# Patient Record
Sex: FEMALE | Race: WHITE | Employment: OTHER | URBAN - METROPOLITAN AREA
[De-identification: names, ages, dates, MRNs, and addresses within clinical notes are randomized per-mention and may not be internally consistent; named-entity substitution may affect disease eponyms.]

---

## 2024-01-04 ENCOUNTER — LAB ENCOUNTER (OUTPATIENT)
Dept: LAB | Age: 79
End: 2024-01-04
Attending: Other
Payer: MEDICARE

## 2024-01-04 ENCOUNTER — HOSPITAL ENCOUNTER (OUTPATIENT)
Dept: CT IMAGING | Facility: HOSPITAL | Age: 79
Discharge: HOME | End: 2024-01-04
Attending: Other
Payer: MEDICARE

## 2024-01-04 ENCOUNTER — OFFICE VISIT (OUTPATIENT)
Dept: NEUROLOGY | Facility: CLINIC | Age: 79
End: 2024-01-04
Payer: MEDICARE

## 2024-01-04 VITALS
SYSTOLIC BLOOD PRESSURE: 131 MMHG | WEIGHT: 127.88 LBS | HEART RATE: 68 BPM | DIASTOLIC BLOOD PRESSURE: 68 MMHG | RESPIRATION RATE: 16 BRPM

## 2024-01-04 DIAGNOSIS — R55 SYNCOPE, UNSPECIFIED SYNCOPE TYPE: ICD-10-CM

## 2024-01-04 DIAGNOSIS — Z86.73 HISTORY OF STROKE: Primary | ICD-10-CM

## 2024-01-04 DIAGNOSIS — Z86.73 HISTORY OF STROKE: ICD-10-CM

## 2024-01-04 DIAGNOSIS — S09.90XA TRAUMATIC INJURY OF HEAD, INITIAL ENCOUNTER: ICD-10-CM

## 2024-01-04 LAB
ALBUMIN SERPL-MCNC: 4.3 G/DL (ref 3.4–5)
ALBUMIN/GLOB SERPL: 1.4 {RATIO} (ref 1–2)
ALP LIVER SERPL-CCNC: 60 U/L
ALT SERPL-CCNC: 18 U/L
ANION GAP SERPL CALC-SCNC: 4 MMOL/L (ref 0–18)
AST SERPL-CCNC: 16 U/L (ref 15–37)
BILIRUB SERPL-MCNC: 0.3 MG/DL (ref 0.1–2)
BUN BLD-MCNC: 31 MG/DL (ref 9–23)
CALCIUM BLD-MCNC: 9.1 MG/DL (ref 8.5–10.1)
CHLORIDE SERPL-SCNC: 105 MMOL/L (ref 98–112)
CO2 SERPL-SCNC: 28 MMOL/L (ref 21–32)
CREAT BLD-MCNC: 1.34 MG/DL
EGFRCR SERPLBLD CKD-EPI 2021: 41 ML/MIN/1.73M2 (ref 60–?)
ERYTHROCYTE [DISTWIDTH] IN BLOOD BY AUTOMATED COUNT: 13.3 %
FASTING STATUS PATIENT QL REPORTED: NO
GLOBULIN PLAS-MCNC: 3 G/DL (ref 2.8–4.4)
GLUCOSE BLD-MCNC: 138 MG/DL (ref 70–99)
HCT VFR BLD AUTO: 34.1 %
HGB BLD-MCNC: 11.1 G/DL
MCH RBC QN AUTO: 30.5 PG (ref 26–34)
MCHC RBC AUTO-ENTMCNC: 32.6 G/DL (ref 31–37)
MCV RBC AUTO: 93.7 FL
OSMOLALITY SERPL CALC.SUM OF ELEC: 293 MOSM/KG (ref 275–295)
PLATELET # BLD AUTO: 196 10(3)UL (ref 150–450)
POTASSIUM SERPL-SCNC: 4.1 MMOL/L (ref 3.5–5.1)
PROT SERPL-MCNC: 7.3 G/DL (ref 6.4–8.2)
RBC # BLD AUTO: 3.64 X10(6)UL
SODIUM SERPL-SCNC: 137 MMOL/L (ref 136–145)
WBC # BLD AUTO: 9.5 X10(3) UL (ref 4–11)

## 2024-01-04 PROCEDURE — 85027 COMPLETE CBC AUTOMATED: CPT

## 2024-01-04 PROCEDURE — 80053 COMPREHEN METABOLIC PANEL: CPT

## 2024-01-04 PROCEDURE — 70450 CT HEAD/BRAIN W/O DYE: CPT | Performed by: OTHER

## 2024-01-04 PROCEDURE — 99205 OFFICE O/P NEW HI 60 MIN: CPT | Performed by: OTHER

## 2024-01-04 RX ORDER — METOPROLOL SUCCINATE 100 MG/1
TABLET, EXTENDED RELEASE ORAL
COMMUNITY
Start: 2023-11-16

## 2024-01-04 RX ORDER — ASPIRIN 325 MG
325 TABLET ORAL DAILY
COMMUNITY

## 2024-01-04 RX ORDER — AMLODIPINE BESYLATE 5 MG/1
5 TABLET ORAL DAILY
COMMUNITY
Start: 2023-12-21

## 2024-01-04 RX ORDER — TRIAMTERENE AND HYDROCHLOROTHIAZIDE 37.5; 25 MG/1; MG/1
1 TABLET ORAL DAILY
COMMUNITY

## 2024-01-04 RX ORDER — ATORVASTATIN CALCIUM 40 MG/1
40 TABLET, FILM COATED ORAL NIGHTLY
Qty: 30 TABLET | Refills: 3 | Status: SHIPPED | OUTPATIENT
Start: 2024-01-04

## 2024-01-04 RX ORDER — CLONIDINE HYDROCHLORIDE 0.1 MG/1
0.1 TABLET ORAL 2 TIMES DAILY
COMMUNITY
Start: 2023-09-11

## 2024-01-04 RX ORDER — ANTIOX #8/OM3/DHA/EPA/LUT/ZEAX 250-2.5 MG
CAPSULE ORAL
COMMUNITY

## 2024-01-04 RX ORDER — LOSARTAN POTASSIUM 100 MG/1
300 TABLET ORAL DAILY
COMMUNITY
Start: 2023-10-24

## 2024-01-04 NOTE — PATIENT INSTRUCTIONS
After your visit at the [facility] today,  please direct any follow up questions or medication needs to the staff in our [place] office so that your concerns may be promptly addressed.  We are available through [email or web address] or at the numbers below:    The phone number is:   [phone] option #1    The fax number is:  [phone]    Your pharmacy should also send any requests electronically to the [place] office.  Refill policies:    Allow 2-3 business days for refills; controlled substances may take longer.  Contact your pharmacy at least 5 days prior to running out of medication and have them send an electronic request or submit request through the “request refill” option in your [email or web address] account.  Refills are not addressed on weekends; covering physicians do not authorize routine medications on weekends.  No narcotics or controlled substances are refilled after noon on Fridays or by on call physicians.  By law, narcotics must be electronically prescribed.  A 30 day supply with no refills is the maximum allowed.  If your prescription is due for a refill, you may be due for a follow up appointment.  To best provide you care, patients receiving routine medications need to be seen at least once a year.  Patients receiving narcotic/controlled substance medications need to be seen at least once every 3 months.  In the event that your preferred pharmacy does not have the requested medication in stock (e.g. Backordered), it is your responsibility to find another pharmacy that has the requested medication available.  We will gladly send a new prescription to that pharmacy at your request.    Scheduling Tests:    If your physician has ordered radiology tests such as MRI or CT scans, please contact Central Scheduling at [phone] right away to schedule the test.  Once scheduled, the [facility] Centralized Referral Team will work with your insurance carrier to obtain pre-certification or prior authorization.   Depending on your insurance carrier, approval may take 3-10 days.  It is highly recommended patients assure they have received an authorization before having a test performed.  If test is done without insurance authorization, patient may be responsible for the entire amount billed.      Precertification and Prior Authorizations:  If your physician has recommended that you have a procedure or additional testing performed the [facility] Centralized Referral Team will contact your insurance carrier to obtain pre-certification or prior authorization.    You are strongly encouraged to contact your insurance carrier to verify that your procedure/test has been approved and is a COVERED benefit.  Although the [facility] Centralized Referral Team does its due diligence, the insurance carrier gives the disclaimer that \"Although the procedure is authorized, this does not guarantee payment.\"    Ultimately the patient is responsible for payment.   Thank you for your understanding in this matter.  Paperwork Completion:  If you require FMLA or disability paperwork for your recovery, please make sure to either drop it off or have it faxed to our office at [phone]. Be sure the form has your name and date of birth on it.  The form will be faxed to our Forms Department and they will complete it for you.  There is a 25$ fee for all forms that need to be filled out.  Please be aware there is a 10-14 day turnaround time.  You will need to sign a release of information (FATMATA) form if your paperwork does not come with one.  You may call the Forms Department with any questions at [phone].  Their fax number is [phone].

## 2024-01-04 NOTE — H&P
Neurology H&P    Elizabeth Zendejas Patient Status:  No patient class for patient encounter    1945 MRN [id number]   Location [facility], [address] [place] [address] Attending No att. providers found   Hosp Day # 0 PCP No primary care provider on file.     Subjective:  Elizabeth Zendejas is a(n) 78 year old female with a past medical history of HTN, HL, and stroke in 2023.  She comes the neurology clinic to establish care at [facility] for her recent small stroke. She comes with a friend today. She has a swollen R face and states that she was found on the floor. Her son found her on the kitchen floor. She does not remember what happened. She states that she remembers being very tired but that's it. This is not why she came to see me however. She came to see me for a stroke that occurred in 2023. She states that she was treated at [facility]. She states that she was trying to call her daughter and that she could not remember or dial the correct number. She ended up getting a hold of her sister in law (Dr. Elliott David/Sanju) who told her to go to the ED. She was taken to the ED by her family. She states that her sodium was found to be abnormal and that she also was found to have a stroke. She had some word finding difficulty and R facial weakness. Per notes she also had a UTI and hyponatremia, and BP elevated to 170's SBP    Current Medications:  Current Outpatient Medications   Medication Sig Dispense Refill    amLODIPine 5 MG Oral Tab Take 1 tablet (5 mg total) by mouth daily.      cloNIDine 0.1 MG Oral Tab Take 1 tablet (0.1 mg total) by mouth 2 (two) times daily.      losartan 100 MG Oral Tab Take 3 tablets (300 mg total) by mouth daily.      metoprolol succinate  MG Oral Tablet 24 Hr       Triamterene-HCTZ 37.5-25 MG Oral Tab Take 1 tablet by mouth daily.      Multiple Vitamins-Minerals (PRESERVISION AREDS 2) Oral Cap Take by mouth.      D-Mannose 500 MG Oral Cap Take by  mouth.      Diphenoxylate-Atropine (LOMOTIL OR) Take 2 mg by mouth as needed.      aspirin 325 MG Oral Tab Take 1 tablet (325 mg total) by mouth daily.         Problem List:  There is no problem list on file for this patient.      PMHx:  No past medical history on file.    PSHx:  No past surgical history on file.    SocHx:  Social History     Tobacco Use    Smoking status: Former     Types: Cigarettes    Smokeless tobacco: Never    Tobacco comments:     Very long long ago   Vaping Use    Vaping Use: Never used   Substance and Sexual Activity    Alcohol use: Yes     Comment: rare, special occ    Drug use: Never   Other Topics Concern    Caffeine Concern Yes     Comment: coffee daily    Exercise Yes     Comment: 3 times per week       Family History:  No family history on file.    No FH of stroke at early age    ROS:  10 point ROS completed and was negative, except for pertinent positive and negatives stated in subjective.    Objective/Physical Exam:    Vital Signs:  Resp. rate 16, weight 127 lb 13.9 oz (58 kg).    Gen: Awake and in no apparent distress  HEENT: moist mucus membranes  Neck: Supple  Cardiovascular: Regular rate and rhythm, no murmur  Pulm: CTAB  GI: non-tender, normal bowel sounds  Skin: normal, dry  Extremities: No clubbing or cyanosis      Neurologic:   MENTAL STATUS: alert, ox3, normal attention, language and fund of knowledge.      CRANIAL NERVES II to XII: PERRLA, no ptosis or diplopia, EOM intact, facial sensation intact, strong eye closure, mild R NLF flattening though difficult to tell due to facial swelling, no dysarthria, tongue midline,  no tongue fasciculations or atrophy, strong shoulder shrug.    MOTOR EXAMINATION: normal tone, no fasciculations, normal strength throughout in UEs and LEs      SENSORY EXAMINATION:  UE: intact to light touch, pinprick intact  LE: intact to light touch, pinprick intact    COORDINATION:  No dysmetria, or intention tremors     REFLEXES: 2+ at biceps, 2+  brachioradialis, 2+ at patella     GAIT: normal stance, normal  gait             Labs:       Imaging:  OSH MRI 8/2023  FINDINGS:    5 mm focus of restricted diffusion in the mid left corona radiata with a couple   punctate foci of restricted diffusion in the posterior left corona radiata.   No acute intracranial hemorrhage or extra-axial fluid collection is identified.   There are mild to moderate periventricular and deep subcortical white matter T2   FLAIR hyperintensities which are non-specific but likely represent mild to   moderate chronic microvascular ischemic changes.   No abnormal parenchymal gradient susceptibility is seen.    There is mild global parenchymal volume loss with mild associated prominence of   the ventricles and sulci. No hydrocephalus.   There is no midline shift or mass effect.  The basal cisterns are intact.    The sagittal T1 image demonstrates normal marrow signal intensity in the   calvarium, skull base, and upper cervical spine.    The expected major intracranial flow voids are present.    Stool and secretions in the left mastoid air cells. Trace secretions in the   right mastoid air cells.. Bilateral lens replacements.   IMPRESSION:    1.  Small recent infarct in the left mid corona radiata with additional couple   punctate recent infarcts in the posterior left corona radiata.     MRA brain 8/2023  IMPRESSION:   Moderate short segment luminal narrowing of the mid left M1 segment which may   relate to atherosclerotic plaque or nonocclusive thrombus.   Of note, tiny aneurysms measuring less than 3 mm in diameter and small vessel   disease involving the peripheral arterial branches cannot be excluded by this   study.     CUS     Approximately 16-49% stenosis of the proximal internal carotid arteries (closer   to 16% on gray scale images).     Assessment:  This is a 78-year-old female with a small stroke at an outside hospital and August 2023.  She came to the neurology clinic today for  follow-up and stroke management.  Her exam as noted above.  Outside hospital records were reviewed today.  Etiology of her stroke is not exactly clear, however she does have a small plaque in the left M1 segment of the MCA, and it is certainly possible that a small amount of plaque broke off causing small embolic appearing strokes.  She states that she has had a 30-day Holter monitor with no known or seen arrhythmias.  She has no history of A-fib.  Her blood pressure is under better control at this time it has been difficult to control in the past.  Her LDL was actually not so bad at 94 during hospitalizations, however for stroke prevention I will start atorvastatin 40 mg nightly.  Of other concern today is that she had a syncopal event in the kitchen recently when did not seek medical attention.  She has a fairly swollen right side of her face with bruising and edema and has no recollection of the event.  She never sought medical attention for this.  I like to the CAT scan of the head as she is on antiplatelet therapy with the amount of bruising, rule out history of intracranial bleed or subarachnoid etc.  I would also like to get an EEG to rule out or at least evaluate for any sort of seizure causing her syncopal spell.  I will also get a CMP and CBC she is establishing care in this hospital system.      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 Syncope  - EEG    3. Head trauma  - CTH  - EEG    A total of 60 minutes was spent with patient >50% of visit was spent in counseling and coordination of care, including discussion of diagnostic workup to date, discussion of medication side effects and plan for additional testing and monitoring as noted above; patient and family allowed to ask questions and all questions answered to the best of my ability.      Roland  Uriel, DO  Neurology

## 2024-01-04 NOTE — PROGRESS NOTES
Stroke at OSH on 8/23/2023.  Pt denies any stroke symptoms since August 2023.     Pt had a fall on 1/2/204.  Pt reports son found her and helped her up, but she has no memory of fall, unsure of LOC.  Pt does have facial bruising.  Pt did not seek ER care at that time.         Pt had stroke at OSH -MRI/MRAfindings;    MRI brain demonstrates small recent infarct over the left mid corona radiata with some other small infarcts over the left coronary radiata. Carotid ultrasound demonstrated no severe stenosis.MRA angio of the head demonstrates some short-segment narrowing over the mid left M1 segment likely a plaque.

## 2024-01-16 ENCOUNTER — NURSE ONLY (OUTPATIENT)
Dept: ELECTROPHYSIOLOGY | Facility: HOSPITAL | Age: 79
End: 2024-01-16
Attending: Other
Payer: MEDICARE

## 2024-01-16 DIAGNOSIS — Z86.73 HISTORY OF STROKE: ICD-10-CM

## 2024-01-16 DIAGNOSIS — R55 SYNCOPE, UNSPECIFIED SYNCOPE TYPE: ICD-10-CM

## 2024-01-16 PROCEDURE — 95816 EEG AWAKE AND DROWSY: CPT

## 2024-02-20 ENCOUNTER — TELEPHONE (OUTPATIENT)
Dept: NEUROLOGY | Facility: CLINIC | Age: 79
End: 2024-02-20

## 2024-02-20 RX ORDER — LOSARTAN POTASSIUM 100 MG/1
100 TABLET ORAL DAILY
COMMUNITY
Start: 2024-02-20

## 2024-02-20 NOTE — TELEPHONE ENCOUNTER
S: Pt reports she had episode on 2/17/2024 with LOC taken via EMS to hospital.    B: LOV      A:Pt reports she had a syncopal event on 2/17/2024 - see above. Pt informing provider per LOV request to call office with any other episodes.    Pt reports she was standing for a long time, no food or anything to drink    CT no acute process, doppler echo -\"minor issues\"    Lab WNL    PCP - reports pt believes she just passed out    PCP recommended cardiology appt, gave pt 30 event monitor, PCP decreased amlodipine to 2.5 mg     RN reviewed medications with pt and per med record it noted she was on 300 mg Losartan.  Pt reports she is taking 100 mg of Losartan, 2.5 mg of Amlodipine and 100 mg of Metoprolol ER.      R: Routed to provider.  Advised pt to seek ER care should this happen again. DIOGO.  Requested pt update CHRISTY when cardiology appt and 30 day event monitor completed. DIOGO.

## 2024-03-07 DIAGNOSIS — Z86.73 HISTORY OF STROKE: ICD-10-CM

## 2024-03-07 RX ORDER — ATORVASTATIN CALCIUM 40 MG/1
40 TABLET, FILM COATED ORAL NIGHTLY
Qty: 90 TABLET | Refills: 0 | Status: SHIPPED | OUTPATIENT
Start: 2024-03-07

## 2024-03-07 NOTE — TELEPHONE ENCOUNTER
Patient requesting 90 day supply.      Medication: ATORVASTATIN CALCIUM ORAL TABLET 40 MG      Date of last refill: 01/04/24 (#30/3)  [place]     Last office visit: 01/04/24  Due back to clinic per last office note:  3 months  Date next office visit scheduled:    Future Appointments   Date Time Provider Department Center   4/11/2024  3:00 PM Roland Trevino DO [id number]           Last OV note recommendation:    Plan:  L MCA stroke 8/2023  - LDL 92 on 8/2/23  - OSH chart reviewed  - MRI/MRA and CUS reports reviewed  - Continue aspirin 325mg PO Qday  - Start Atorvastatin 40mg nightly  - LDL goal <70     2. Syncope  - EEG     3. Head trauma  - CTH  - EEG

## 2024-04-11 ENCOUNTER — OFFICE VISIT (OUTPATIENT)
Dept: NEUROLOGY | Facility: CLINIC | Age: 79
End: 2024-04-11
Payer: MEDICARE

## 2024-04-11 VITALS
HEART RATE: 64 BPM | RESPIRATION RATE: 16 BRPM | SYSTOLIC BLOOD PRESSURE: 136 MMHG | DIASTOLIC BLOOD PRESSURE: 72 MMHG | WEIGHT: 121.94 LBS

## 2024-04-11 DIAGNOSIS — Z86.73 HISTORY OF STROKE: Primary | ICD-10-CM

## 2024-04-11 PROCEDURE — 99213 OFFICE O/P EST LOW 20 MIN: CPT | Performed by: OTHER

## 2024-04-11 RX ORDER — AMLODIPINE BESYLATE 2.5 MG/1
2.5 TABLET ORAL DAILY
COMMUNITY
Start: 2024-02-20

## 2024-04-11 NOTE — PROGRESS NOTES
Neurology H&P    Elizabeth Zendejas Patient Status:  No patient class for patient encounter    1945 MRN [id number]   Location [facility], [address] [place] [address] Attending No att. providers found   Hosp Day # 0 PCP No primary care provider on file.     Subjective:  Initial Clinic HPI 2024  Elizabeth Zendejas is a(n) 79 year old female with a past medical history of HTN, HL, and stroke in 2023.  She comes the neurology clinic to establish care at [facility] for her recent small stroke. She comes with a friend today. She has a swollen R face and states that she was found on the floor. Her son found her on the kitchen floor. She does not remember what happened. She states that she remembers being very tired but that's it. This is not why she came to see me however. She came to see me for a stroke that occurred in 2023. She states that she was treated at [facility]. She states that she was trying to call her daughter and that she could not remember or dial the correct number. She ended up getting a hold of her sister in law (Dr. Elliott David/Sanju) who told her to go to the ED. She was taken to the ED by her family. She states that her sodium was found to be abnormal and that she also was found to have a stroke. She had some word finding difficulty and R facial weakness. Per notes she also had a UTI and hyponatremia, and BP elevated to 170's SBP    Interim history:  Patient was last seen in clinic on  for her stroke.  She is currently taking atorvastatin and aspirin for secondary prevention.  Since her last clinic evaluation she had a Scan of the head which as noted below, and EEG which was normal.  CTH did not show any acute lesions or bleed.  She denies any new strokelike symptoms.  She denies any new numbness weakness tingling. She does tell me that on  she was at a party and was told that her face seemed weaker on the R. She states that she does  not think that it was actually weak however. She states that she was not feeling well and passed out. She was told to go to the ED and so did do this. She was taken to an OSH and per chart it was thought to be due to vasovagal syncope. She saw neurology and cardiology while she was in the hospital. She has a TTE. She has alsop had a holter monitor and per report no arhythmia seen on this.     Current Medications:  Current Outpatient Medications   Medication Sig Dispense Refill    amLODIPine 2.5 MG Oral Tab Take 1 tablet (2.5 mg total) by mouth daily.      atorvastatin 40 MG Oral Tab Take 1 tablet (40 mg total) by mouth nightly. 90 tablet 0    losartan 100 MG Oral Tab Take 1 tablet (100 mg total) by mouth daily.      metoprolol succinate  MG Oral Tablet 24 Hr       Triamterene-HCTZ 37.5-25 MG Oral Tab Take 1 tablet by mouth daily.      Multiple Vitamins-Minerals (PRESERVISION AREDS 2) Oral Cap Take by mouth.      D-Mannose 500 MG Oral Cap Take by mouth.      aspirin 325 MG Oral Tab Take 1 tablet (325 mg total) by mouth daily.         Problem List:  Patient Active Problem List   Diagnosis    History of stroke    Syncope    Head trauma       PMHx:  Past Medical History:    Hypertension    Stroke (cerebrum) (HCC)       PSHx:  No past surgical history on file.    SocHx:  Social History     Socioeconomic History    Marital status: Unknown   Tobacco Use    Smoking status: Former     Types: Cigarettes    Smokeless tobacco: Never    Tobacco comments:     Very long long ago   Vaping Use    Vaping status: Never Used   Substance and Sexual Activity    Alcohol use: Yes     Comment: rare, special occ    Drug use: Never   Other Topics Concern    Caffeine Concern Yes     Comment: coffee daily    Exercise Yes     Comment: 3 times per week     Social Determinants of Health     Food Insecurity: Low Risk  (2/17/2024)    [facility]    Food Insecurity     Have  there been times that your food ran out, and you didn't have money to get more?: No     Are there times that you worry that this might happen?: No   Transportation Needs: Low Risk  (2/17/2024)    [facility]    Transportation Needs     Do you have trouble getting transportation to medical appointments?: No    [facility] from [facility], [facility]    Social Connections       Family History:  Family History   Problem Relation Age of Onset    Other (Alzheimer's) Mother     Stroke Brother        No FH of stroke at early age    ROS:  10 point ROS completed and was negative, except for pertinent positive and negatives stated in subjective.    Objective/Physical Exam:    Vital Signs:  Blood pressure 136/72, pulse 64, resp. rate 16, weight 121 lb 14.6 oz (55.3 kg).    Gen: Awake and in no apparent distress  HEENT: moist mucus membranes  Neck: Supple  Cardiovascular: Regular rate and rhythm, no murmur  Pulm: CTAB  GI: non-tender, normal bowel sounds  Skin: normal, dry  Extremities: No clubbing or cyanosis      Neurologic:   MENTAL STATUS: alert, ox3, normal attention, language and fund of knowledge.      CRANIAL NERVES II to XII: PERRLA, no ptosis or diplopia, EOM intact, facial sensation intact, strong eye closure, mild R NLF flattening though difficult to tell due to facial swelling, no dysarthria, tongue midline,  no tongue fasciculations or atrophy, strong shoulder shrug.    MOTOR EXAMINATION: normal tone, no fasciculations, normal strength throughout in UEs and LEs      SENSORY EXAMINATION:  UE: intact to light touch, pinprick intact  LE: intact to light touch, pinprick intact    COORDINATION:  No dysmetria, or intention tremors     REFLEXES: 2+ at biceps, 2+ brachioradialis, 2+ at patella     GAIT: normal stance, normal  gait             Labs:       Imaging:  OSH MRI 8/2023  FINDINGS:    5 mm focus of restricted  diffusion in the mid left corona radiata with a couple   punctate foci of restricted diffusion in the posterior left corona radiata.   No acute intracranial hemorrhage or extra-axial fluid collection is identified.   There are mild to moderate periventricular and deep subcortical white matter T2   FLAIR hyperintensities which are non-specific but likely represent mild to   moderate chronic microvascular ischemic changes.   No abnormal parenchymal gradient susceptibility is seen.    There is mild global parenchymal volume loss with mild associated prominence of   the ventricles and sulci. No hydrocephalus.   There is no midline shift or mass effect.  The basal cisterns are intact.    The sagittal T1 image demonstrates normal marrow signal intensity in the   calvarium, skull base, and upper cervical spine.    The expected major intracranial flow voids are present.    Stool and secretions in the left mastoid air cells. Trace secretions in the   right mastoid air cells.. Bilateral lens replacements.   IMPRESSION:    1.  Small recent infarct in the left mid corona radiata with additional couple   punctate recent infarcts in the posterior left corona radiata.     MRA brain 8/2023  IMPRESSION:   Moderate short segment luminal narrowing of the mid left M1 segment which may   relate to atherosclerotic plaque or nonocclusive thrombus.   Of note, tiny aneurysms measuring less than 3 mm in diameter and small vessel   disease involving the peripheral arterial branches cannot be excluded by this   study.     CUS     Approximately 16-49% stenosis of the proximal internal carotid arteries (closer   to 16% on gray scale images).     EEG 1/16/24  IMPRESSION:  This EEG is a normal study recorded in the awake states. No focal, lateralizing, or epileptiform activity is seen and no seizures are recorded.      Report covers  Start 1/16/24 at 1106  End 1/16/24 at 1153    [facility] 1/4/24  CONCLUSION:  No acute bleed.  White matter signal  hyperintensities somewhat asymmetric greater to the left, which probably represent asymmetric chronic ischemic white matter changes which were present on MRI from August 2023. However if there are   clinical or symptoms suspicious for acute infarct or ischemia, then advise MRI follow-up for further evaluation of these findings.  No midline shift, mass effect herniation hydrocephalus.     Assessment:  This is a 79-year-old female with a small stroke at an outside hospital and August 2023.  She came to the neurology clinic today for follow-up and stroke management.  Her exam as noted above.  OSH records reviewed.      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 Syncope  - EEG was normal    3. Head trauma  - CTH  - EEG was normal     RTC 6 months      Roland Trevino DO  Neurology

## 2024-04-11 NOTE — PATIENT INSTRUCTIONS
After your visit at the [facility] today,  please direct any follow up questions or medication needs to the staff in our [place] office so that your concerns may be promptly addressed.  We are available through [facility] or at the numbers below:    The phone number is:   [phone] option #1    The fax number is:  [phone]    Your pharmacy should also send any requests electronically to the [place] office.  Refill policies:    Allow 2-3 business days for refills; controlled substances may take longer.  Contact your pharmacy at least 5 days prior to running out of medication and have them send an electronic request or submit request through the “request refill” option in your [facility] account.  Refills are not addressed on weekends; covering physicians do not authorize routine medications on weekends.  No narcotics or controlled substances are refilled after noon on Fridays or by on call physicians.  By law, narcotics must be electronically prescribed.  A 30 day supply with no refills is the maximum allowed.  If your prescription is due for a refill, you may be due for a follow up appointment.  To best provide you care, patients receiving routine medications need to be seen at least once a year.  Patients receiving narcotic/controlled substance medications need to be seen at least once every 3 months.  In the event that your preferred pharmacy does not have the requested medication in stock (e.g. Backordered), it is your responsibility to find another pharmacy that has the requested medication available.  We will gladly send a new prescription to that pharmacy at your request.    Scheduling Tests:    If your physician has ordered radiology tests such as MRI or CT scans, please contact Central Scheduling at [phone] right away to schedule the test.  Once scheduled, the [facility] Centralized Referral Team will work with your insurance carrier to obtain pre-certification or prior authorization.   Depending on your insurance carrier, approval may take 3-10 days.  It is highly recommended patients assure they have received an authorization before having a test performed.  If test is done without insurance authorization, patient may be responsible for the entire amount billed.      Precertification and Prior Authorizations:  If your physician has recommended that you have a procedure or additional testing performed the [facility] Centralized Referral Team will contact your insurance carrier to obtain pre-certification or prior authorization.    You are strongly encouraged to contact your insurance carrier to verify that your procedure/test has been approved and is a COVERED benefit.  Although the [facility] Centralized Referral Team does its due diligence, the insurance carrier gives the disclaimer that \"Although the procedure is authorized, this does not guarantee payment.\"    Ultimately the patient is responsible for payment.   Thank you for your understanding in this matter.  Paperwork Completion:  If you require FMLA or disability paperwork for your recovery, please make sure to either drop it off or have it faxed to our office at [phone]. Be sure the form has your name and date of birth on it.  The form will be faxed to our Forms Department and they will complete it for you.  There is a 25$ fee for all forms that need to be filled out.  Please be aware there is a 10-14 day turnaround time.  You will need to sign a release of information (FATMATA) form if your paperwork does not come with one.  You may call the Forms Department with any questions at [phone].  Their fax number is [phone].

## 2024-04-30 ENCOUNTER — APPOINTMENT (OUTPATIENT)
Dept: GENERAL RADIOLOGY | Facility: HOSPITAL | Age: 79
End: 2024-04-30
Attending: EMERGENCY MEDICINE
Payer: MEDICARE

## 2024-04-30 ENCOUNTER — APPOINTMENT (OUTPATIENT)
Dept: GENERAL RADIOLOGY | Facility: HOSPITAL | Age: 79
End: 2024-04-30
Payer: MEDICARE

## 2024-04-30 ENCOUNTER — APPOINTMENT (OUTPATIENT)
Dept: CT IMAGING | Facility: HOSPITAL | Age: 79
End: 2024-04-30
Attending: EMERGENCY MEDICINE
Payer: MEDICARE

## 2024-04-30 ENCOUNTER — HOSPITAL ENCOUNTER (OUTPATIENT)
Facility: HOSPITAL | Age: 79
Setting detail: OBSERVATION
Discharge: HOME | End: 2024-05-01
Attending: EMERGENCY MEDICINE | Admitting: HOSPITALIST
Payer: MEDICARE

## 2024-04-30 DIAGNOSIS — R55 SYNCOPE, UNSPECIFIED SYNCOPE TYPE: Primary | ICD-10-CM

## 2024-04-30 DIAGNOSIS — R19.7 DIARRHEA, UNSPECIFIED TYPE: ICD-10-CM

## 2024-04-30 LAB
ADENOVIRUS F 40/41 PCR: NEGATIVE
ALBUMIN SERPL-MCNC: 3.5 G/DL (ref 3.4–5)
ALBUMIN/GLOB SERPL: 1.1 {RATIO} (ref 1–2)
ALP LIVER SERPL-CCNC: 109 U/L
ALT SERPL-CCNC: 27 U/L
ANION GAP SERPL CALC-SCNC: 9 MMOL/L (ref 0–18)
AST SERPL-CCNC: 21 U/L (ref 15–37)
ASTROVIRUS PCR: NEGATIVE
ATRIAL RATE: 65 BPM
BASOPHILS # BLD AUTO: 0.03 X10(3) UL (ref 0–0.2)
BASOPHILS NFR BLD AUTO: 0.4 %
BILIRUB SERPL-MCNC: 0.3 MG/DL (ref 0.1–2)
BILIRUB UR QL STRIP.AUTO: NEGATIVE
BUN BLD-MCNC: 23 MG/DL (ref 9–23)
C CAYETANENSIS DNA SPEC QL NAA+PROBE: NEGATIVE
C DIFF TOX B STL QL: NEGATIVE
CALCIUM BLD-MCNC: 9.2 MG/DL (ref 8.5–10.1)
CAMPY SP DNA.DIARRHEA STL QL NAA+PROBE: NEGATIVE
CHLORIDE SERPL-SCNC: 98 MMOL/L (ref 98–112)
CLARITY UR REFRACT.AUTO: CLEAR
CO2 SERPL-SCNC: 24 MMOL/L (ref 21–32)
COLOR UR AUTO: COLORLESS
CREAT BLD-MCNC: 1.02 MG/DL
CRYPTOSP DNA SPEC QL NAA+PROBE: NEGATIVE
EAEC PAA PLAS AGGR+AATA ST NAA+NON-PRB: NEGATIVE
EC STX1+STX2 + H7 FLIC SPEC NAA+PROBE: NEGATIVE
EGFRCR SERPLBLD CKD-EPI 2021: 56 ML/MIN/1.73M2 (ref 60–?)
ENTAMOEBA HISTOLYTICA PCR: NEGATIVE
EOSINOPHIL # BLD AUTO: 0.16 X10(3) UL (ref 0–0.7)
EOSINOPHIL NFR BLD AUTO: 2.1 %
EPEC EAE GENE STL QL NAA+NON-PROBE: NEGATIVE
ERYTHROCYTE [DISTWIDTH] IN BLOOD BY AUTOMATED COUNT: 13 %
ETEC LTA+ST1A+ST1B TOX ST NAA+NON-PROBE: NEGATIVE
GIARDIA LAMBLIA PCR: NEGATIVE
GLOBULIN PLAS-MCNC: 3.3 G/DL (ref 2.8–4.4)
GLUCOSE BLD-MCNC: 172 MG/DL (ref 70–99)
GLUCOSE UR STRIP.AUTO-MCNC: NORMAL MG/DL
HCT VFR BLD AUTO: 29.5 %
HGB BLD-MCNC: 10.4 G/DL
IMM GRANULOCYTES # BLD AUTO: 0.02 X10(3) UL (ref 0–1)
IMM GRANULOCYTES NFR BLD: 0.3 %
KETONES UR STRIP.AUTO-MCNC: NEGATIVE MG/DL
LEUKOCYTE ESTERASE UR QL STRIP.AUTO: 25
LYMPHOCYTES # BLD AUTO: 2.21 X10(3) UL (ref 1–4)
LYMPHOCYTES NFR BLD AUTO: 29.5 %
MCH RBC QN AUTO: 31.7 PG (ref 26–34)
MCHC RBC AUTO-ENTMCNC: 35.3 G/DL (ref 31–37)
MCV RBC AUTO: 89.9 FL
MONOCYTES # BLD AUTO: 0.6 X10(3) UL (ref 0.1–1)
MONOCYTES NFR BLD AUTO: 8 %
NEUTROPHILS # BLD AUTO: 4.47 X10 (3) UL (ref 1.5–7.7)
NEUTROPHILS # BLD AUTO: 4.47 X10(3) UL (ref 1.5–7.7)
NEUTROPHILS NFR BLD AUTO: 59.7 %
NITRITE UR QL STRIP.AUTO: NEGATIVE
NOROVIRUS GI/GII PCR: NEGATIVE
OSMOLALITY SERPL CALC.SUM OF ELEC: 280 MOSM/KG (ref 275–295)
P AXIS: 67 DEGREES
P SHIGELLOIDES DNA STL QL NAA+PROBE: NEGATIVE
P-R INTERVAL: 178 MS
PH UR STRIP.AUTO: 6 [PH] (ref 5–8)
PLATELET # BLD AUTO: 197 10(3)UL (ref 150–450)
POTASSIUM SERPL-SCNC: 3.6 MMOL/L (ref 3.5–5.1)
PROT SERPL-MCNC: 6.8 G/DL (ref 6.4–8.2)
PROT UR STRIP.AUTO-MCNC: NEGATIVE MG/DL
Q-T INTERVAL: 404 MS
QRS DURATION: 80 MS
QTC CALCULATION (BEZET): 420 MS
R AXIS: -18 DEGREES
RBC # BLD AUTO: 3.28 X10(6)UL
RBC UR QL AUTO: NEGATIVE
ROTAVIRUS A PCR: NEGATIVE
SALMONELLA DNA SPEC QL NAA+PROBE: NEGATIVE
SAPOVIRUS PCR: NEGATIVE
SHIGELLA SP+EIEC IPAH ST NAA+NON-PROBE: NEGATIVE
SODIUM SERPL-SCNC: 131 MMOL/L (ref 136–145)
SP GR UR STRIP.AUTO: 1.01 (ref 1–1.03)
T AXIS: 33 DEGREES
TROPONIN I SERPL HS-MCNC: 5 NG/L
UROBILINOGEN UR STRIP.AUTO-MCNC: NORMAL MG/DL
V CHOLERAE DNA SPEC QL NAA+PROBE: NEGATIVE
VENTRICULAR RATE: 65 BPM
VIBRIO DNA SPEC NAA+PROBE: NEGATIVE
WBC # BLD AUTO: 7.5 X10(3) UL (ref 4–11)
YERSINIA DNA SPEC NAA+PROBE: NEGATIVE

## 2024-04-30 PROCEDURE — 70450 CT HEAD/BRAIN W/O DYE: CPT | Performed by: EMERGENCY MEDICINE

## 2024-04-30 PROCEDURE — 71045 X-RAY EXAM CHEST 1 VIEW: CPT | Performed by: EMERGENCY MEDICINE

## 2024-04-30 PROCEDURE — 99223 1ST HOSP IP/OBS HIGH 75: CPT | Performed by: HOSPITALIST

## 2024-04-30 RX ORDER — TRIAMTERENE AND HYDROCHLOROTHIAZIDE 37.5; 25 MG/1; MG/1
1 CAPSULE ORAL EVERY MORNING
COMMUNITY
End: 2024-05-01

## 2024-04-30 RX ORDER — SENNOSIDES 8.6 MG
17.2 TABLET ORAL NIGHTLY PRN
Status: DISCONTINUED | OUTPATIENT
Start: 2024-04-30 | End: 2024-05-01

## 2024-04-30 RX ORDER — POLYETHYLENE GLYCOL 3350 17 G/17G
17 POWDER, FOR SOLUTION ORAL DAILY PRN
Status: DISCONTINUED | OUTPATIENT
Start: 2024-04-30 | End: 2024-05-01

## 2024-04-30 RX ORDER — ACETAMINOPHEN 500 MG
500 TABLET ORAL EVERY 4 HOURS PRN
Status: DISCONTINUED | OUTPATIENT
Start: 2024-04-30 | End: 2024-05-01

## 2024-04-30 RX ORDER — AMLODIPINE BESYLATE 2.5 MG/1
2.5 TABLET ORAL DAILY
Status: DISCONTINUED | OUTPATIENT
Start: 2024-04-30 | End: 2024-05-01

## 2024-04-30 RX ORDER — METOCLOPRAMIDE HYDROCHLORIDE 5 MG/ML
5 INJECTION INTRAMUSCULAR; INTRAVENOUS EVERY 8 HOURS PRN
Status: DISCONTINUED | OUTPATIENT
Start: 2024-04-30 | End: 2024-05-01

## 2024-04-30 RX ORDER — BISACODYL 10 MG
10 SUPPOSITORY, RECTAL RECTAL
Status: DISCONTINUED | OUTPATIENT
Start: 2024-04-30 | End: 2024-05-01

## 2024-04-30 RX ORDER — ATORVASTATIN CALCIUM 40 MG/1
40 TABLET, FILM COATED ORAL NIGHTLY
Status: DISCONTINUED | OUTPATIENT
Start: 2024-04-30 | End: 2024-05-01

## 2024-04-30 RX ORDER — SODIUM CHLORIDE 9 MG/ML
INJECTION, SOLUTION INTRAVENOUS CONTINUOUS
Status: ACTIVE | OUTPATIENT
Start: 2024-04-30 | End: 2024-05-01

## 2024-04-30 RX ORDER — ENEMA 19; 7 G/133ML; G/133ML
1 ENEMA RECTAL ONCE AS NEEDED
Status: DISCONTINUED | OUTPATIENT
Start: 2024-04-30 | End: 2024-05-01

## 2024-04-30 RX ORDER — ONDANSETRON 2 MG/ML
4 INJECTION INTRAMUSCULAR; INTRAVENOUS EVERY 6 HOURS PRN
Status: DISCONTINUED | OUTPATIENT
Start: 2024-04-30 | End: 2024-05-01

## 2024-04-30 RX ORDER — METOPROLOL SUCCINATE 50 MG/1
50 TABLET, EXTENDED RELEASE ORAL
Status: DISCONTINUED | OUTPATIENT
Start: 2024-05-01 | End: 2024-05-01

## 2024-04-30 RX ORDER — SODIUM CHLORIDE 9 MG/ML
INJECTION, SOLUTION INTRAVENOUS CONTINUOUS
Status: DISCONTINUED | OUTPATIENT
Start: 2024-04-30 | End: 2024-05-01

## 2024-04-30 RX ORDER — LOSARTAN POTASSIUM 100 MG/1
100 TABLET ORAL DAILY
Status: DISCONTINUED | OUTPATIENT
Start: 2024-04-30 | End: 2024-05-01

## 2024-04-30 RX ORDER — ONDANSETRON 2 MG/ML
4 INJECTION INTRAMUSCULAR; INTRAVENOUS EVERY 4 HOURS PRN
Status: DISCONTINUED | OUTPATIENT
Start: 2024-04-30 | End: 2024-04-30

## 2024-04-30 RX ORDER — ASPIRIN 325 MG
325 TABLET ORAL DAILY
Status: DISCONTINUED | OUTPATIENT
Start: 2024-04-30 | End: 2024-05-01

## 2024-04-30 NOTE — ED INITIAL ASSESSMENT (HPI)
Patient to ED via EMS.  Reports she was at the at the [facility] with her friend, had walked around then was sitting and drinking coffee with her friend.  Patient reports her friend told her that she just passed out.  No injury or trauma  Patient denies any complaints. Denies chest pain today, dizziness, or SOB.  Reports with vomit x 1 after passing out.  Hx of same in December and February, wore a holter monitor after and they didn't see anything.

## 2024-04-30 NOTE — ED QUICK NOTES
Patient had 4 BM's here in the ER, stool was solid and progressively getting more loose.  Stool sample obtained, will send to lab.

## 2024-04-30 NOTE — H&P
[facility]     Elizabeth Zendejas Patient Status:  Emergency    1945 MRN [id number]   Location [facility] EMERGENCY DEPARTMENT Attending Rogerio Rodas*   Hosp Day # 0 PCP DAYO SANCHEZ     Chief Complaint: Syncope    Subjective:    History of Present Illness:     Elizabeth Zendejas is a 79 year old female with history of stroke and hypertension history of chronic anemia GERD and syncope who was brought to emergency room to be evaluated for syncopal episode.  Patient states that she was at the park with her friend and after walking and sitting down she does not remember what happened but her friend told her that she lost her consciousness.  Patient denies any chest pain shortness of breath dizziness.  She had similar episodes last year and had a Holter monitor on at that time.    History/Other:    Past Medical History:  Past Medical History:    Hypertension    Stroke (cerebrum) (HCC)     Past Surgical History:   Past Surgical History:   Procedure Laterality Date     delivery only      Eye surgery        Family History:   Family History   Problem Relation Age of Onset    Other (Alzheimer's) Mother     Stroke Brother      Social History:    reports that she has quit smoking. Her smoking use included cigarettes. She has never used smokeless tobacco. She reports current alcohol use. She reports that she does not use drugs.     Allergies:   Allergies   Allergen Reactions    Penicillins HIVES and OTHER (SEE COMMENTS)    Sulfa Antibiotics RASH       Medications:    No current facility-administered medications on file prior to encounter.     Current Outpatient Medications on File Prior to Encounter   Medication Sig Dispense Refill    amLODIPine 2.5 MG Oral Tab Take 1 tablet (2.5 mg total) by mouth daily.      atorvastatin 40 MG Oral Tab Take 1 tablet (40 mg total) by mouth nightly. 90 tablet 0    losartan 100 MG Oral Tab Take 1 tablet (100 mg total) by  mouth daily.      metoprolol succinate  MG Oral Tablet 24 Hr       Triamterene-HCTZ 37.5-25 MG Oral Tab Take 1 tablet by mouth daily.      Multiple Vitamins-Minerals (PRESERVISION AREDS 2) Oral Cap Take by mouth.      D-Mannose 500 MG Oral Cap Take by mouth.      aspirin 325 MG Oral Tab Take 1 tablet (325 mg total) by mouth daily.         Review of Systems:   A comprehensive review of systems was completed.    Pertinent positives and negatives noted in the HPI.    Objective:   Physical Exam:    /48   Pulse 62   Temp 98.6 °F (37 °C) (Temporal)   Resp 14   Ht 5' (1.524 m)   Wt 120 lb (54.4 kg)   SpO2 99%   BMI 23.44 kg/m²   General: No acute distress, Alert  Respiratory: No rhonchi, no wheezes  Cardiovascular: S1, S2. Regular rate and rhythm  Abdomen: Soft, Non-tender, non-distended, positive bowel sounds  Neuro: No new focal deficits  Extremities: No edema      Results:    Labs:      Labs Last 24 Hours:    Recent Labs   Lab 04/30/24  1358   RBC 3.28*   HGB 10.4*   HCT 29.5*   MCV 89.9   MCH 31.7   MCHC 35.3   RDW 13.0   NEPRELIM 4.47   WBC 7.5   .0       Recent Labs   Lab 04/30/24  1358   *   BUN 23   CREATSERUM 1.02   EGFRCR 56*   CA 9.2   ALB 3.5   *   K 3.6   CL 98   CO2 24.0   ALKPHO 109   AST 21   ALT 27   BILT 0.3   TP 6.8       No results found for: \"PT\", \"INR\"    Recent Labs   Lab 04/30/24  1358   TROPHS 5       No results for input(s): \"TROP\", \"PBNP\" in the last 168 hours.    No results for input(s): \"PCT\" in the last 168 hours.    Imaging: Imaging data reviewed in Epic.    Assessment & Plan:      # 79 years old female with multiple syncopal episodes in the past also recent history of stroke presents with syncopal episode again  -Patient does not remember much about it and details are taken from family  -No focal neurological deficit  -CT scan with cerebral atrophy and chronic microvascular ischemic changes    # Continue cardiac telemetry monitoring  -No evidence of  arrhythmia  -Scheduled for loop recorder placement later this week        Plan of care discussed with patient and emergency room physician    Diana Hines MD    Supplementary Documentation:     The 21st Century Cures Act makes medical notes like these available to patients in the interest of transparency. Please be advised this is a medical document. Medical documents are intended to carry relevant information, facts as evident, and the clinical opinion of the practitioner. The medical note is intended as peer to peer communication and may appear blunt or direct. It is written in medical language and may contain abbreviations or verbiage that are unfamiliar.

## 2024-04-30 NOTE — ED QUICK NOTES
Bedside report given to receiving RN  Rounding Completed    Plan of Care reviewed. Waiting for CT scan.  Elimination needs assessed.  Bed is locked and in lowest position. Call light within reach.

## 2024-04-30 NOTE — ED QUICK NOTES
Orders for admission, patient is aware of plan and ready to go upstairs. Any questions, please call ED RN Keith at extension [phone].     Patient Covid vaccination status: Fully vaccinated     COVID Test Ordered in ED: None    COVID Suspicion at Admission: N/A    Running Infusions:      Mental Status/LOC at time of transport: A&Ox4    Other pertinent information:   CIWA score: N/A   NIH score:  N/A

## 2024-04-30 NOTE — ED PROVIDER NOTES
Patient Seen in: [facility] Emergency Department      History     Chief Complaint   Patient presents with    Syncope     Stated Complaint: syncope x 2    Subjective:   HPI    79-year-old female with past medical history of hypertension and CVA presents today for evaluation of a syncopal episode.  History is supplemented by patient's friend.  In August, patient was hospitalized for a stroke.  It was concerned that she had stroke in multiple areas that she may have had a cardiac arrhythmia.  She wore a Holter monitor which did not show any cardiac arrhythmia.  She is scheduled for loop recorder in the coming week.  In February, she suffered another syncopal episode and she was seen in outside hospital ER.  Today, patient was at the [facility] with her friend.  They were walking everything was seemingly normal.  Patient has IBS and she had several loose stools earlier today which is not atypical for her.  Just prior to arrival, they were sitting on a bench.  The patient slumped over and suffered a syncopal episode.  After the syncopal episode, patient had an episode of emesis.  Since then, patient has nearly completely returned back to her baseline self.  Patient denies any headache, weakness, sensory changes, chest pain, shortness of breath or any other physical symptoms aside from loose stools.    Objective:   Past Medical History:    Hypertension    Stroke (cerebrum) (HCC)              Past Surgical History:   Procedure Laterality Date     delivery only      Eye surgery                  Social History     Socioeconomic History    Marital status: Unknown   Tobacco Use    Smoking status: Former     Types: Cigarettes    Smokeless tobacco: Never    Tobacco comments:     Very long long ago   Vaping Use    Vaping status: Never Used   Substance and Sexual Activity    Alcohol use: Yes     Comment: rare, special occ    Drug use: Never   Other Topics Concern    Caffeine Concern Yes     Comment: coffee daily     Exercise Yes     Comment: 3 times per week     Social Determinants of Health     Food Insecurity: Low Risk  (2/17/2024)    [facility]    [facility] Insecurity     Have there been times that your food ran out, and you didn't have money to get more?: No     Are there times that you worry that this might happen?: No   Transportation Needs: Low Risk  (2/17/2024)    Received from [facility]    Transportation Needs     Do you have trouble getting transportation to medical appointments?: No    [facility]    Social Connections              Review of Systems    Positive for stated complaint: syncope x 2  Other systems are as noted in HPI.  Constitutional and vital signs reviewed.      All other systems reviewed and negative except as noted above.    Physical Exam     ED Triage Vitals   BP 04/30/24 1402 122/52   Pulse 04/30/24 1359 67   Resp 04/30/24 1359 14   Temp 04/30/24 1448 98.6 °F (37 °C)   Temp src 04/30/24 1448 Temporal   SpO2 04/30/24 1402 100 %   O2 Device 04/30/24 1359 None (Room air)       Current:/54   Pulse 59   Temp 98.6 °F (37 °C) (Temporal)   Resp 15   Ht 152.4 cm (5')   Wt 54.4 kg   SpO2 100%   BMI 23.44 kg/m²         Physical Exam  Vitals and nursing note reviewed.   Constitutional:       Appearance: Normal appearance.   HENT:      Head: Normocephalic and atraumatic.      Nose: Nose normal.      Mouth/Throat:      Mouth: Mucous membranes are moist.   Eyes:      Extraocular Movements: Extraocular movements intact.      Pupils: Pupils are equal, round, and reactive to light.   Cardiovascular:      Rate and Rhythm: Normal rate and regular rhythm.   Pulmonary:      Effort: Pulmonary effort is normal.      Breath sounds: Normal breath sounds.   Abdominal:      Palpations: Abdomen is soft.      Tenderness: There is no  abdominal tenderness.   Musculoskeletal:         General: Normal range of motion.      Cervical back: Neck supple.   Skin:     General: Skin is warm.   Neurological:      General: No focal deficit present.      Mental Status: She is alert.      Cranial Nerves: No cranial nerve deficit.      Sensory: No sensory deficit.      Motor: No weakness.      Coordination: Coordination normal.   Psychiatric:         Mood and Affect: Mood normal.               ED Course     Labs Reviewed   COMP METABOLIC PANEL (14) - Abnormal; Notable for the following components:       Result Value    Glucose 172 (*)     Sodium 131 (*)     eGFR-Cr 56 (*)     All other components within normal limits   CBC W/ DIFFERENTIAL - Abnormal; Notable for the following components:    RBC 3.28 (*)     HGB 10.4 (*)     HCT 29.5 (*)     All other components within normal limits   TROPONIN I HIGH SENSITIVITY - Normal   C. DIFFICILE(TOXIGENIC)PCR - Normal   CBC WITH DIFFERENTIAL WITH PLATELET    Narrative:     The following orders were created for panel order CBC With Differential With Platelet.  Procedure                               Abnormality         Status                     ---------                               -----------         ------                     CBC W/ DIFFERENTIAL[[id number]]          Abnormal            Final result                 Please view results for these tests on the individual orders.   URINALYSIS, ROUTINE   RAINBOW DRAW LAVENDER   RAINBOW DRAW LIGHT GREEN   RAINBOW DRAW BLUE   GI STOOL PANEL BY PCR     EKG    Rate, intervals and axes as noted on EKG Report.  Rate: 65  Rhythm: Sinus Rhythm  Reading: no stemi                 XR CHEST AP PORTABLE  (CPT=71045)    Result Date: 4/30/2024  PROCEDURE:  XR CHEST AP PORTABLE  (CPT=71045)  TECHNIQUE:  AP chest radiograph was obtained.  COMPARISON:  None.  INDICATIONS:  syncope x 2  PATIENT STATED HISTORY: (As transcribed by Technologist)               CONCLUSION:  Normal heart size and  pulmonary vascularity.  No focal infiltrate, consolidation, effusion or pneumothorax.   LOCATION:  [id number]      Dictated by (CST): Ann Mejia MD on 4/30/2024 at 4:19 PM     Finalized by (CST): Ann Mejia MD on 4/30/2024 at 4:20 PM               [facility]      Differential Diagnosis  79-year-old female presents today for evaluation of a syncopal episode.  Patient states she is currently back to her baseline self.  Her only physical symptom is loose stools that she attributes to her IBS.  Her neuroexam is unremarkable.  She is currently hemodynamically stable and well-appearing.  Plan for CT of the head to assess for acute intracranial abnormality.  Will obtain labs to assess for acute kidney injury, electrolyte abnormality, myocardial ischemia or anemia.  Will observe and reassess.    5:09 pm  Patient's ED workup to this point is unremarkable.  We are still awaiting CT scan results at this time.  On reassessment, she is hemodynamically stable and in no acute distress.  She has no neurological deficits currently.  While in the ED, patient has had greater than 5 loose stools.  I suspect that may be hypovolemia from loose stools throughout the day today contributed to her syncope.  Will admit to the hospital for monitoring and fluids.  I spoke with the hospitalist in regards to admission.    External Chart Reviewed  I reviewed documentation and the MRI report from her hospitalization in August 2023    History from [facility]  Patient's friend provides history    Discussions of Management  I spoke with the hospitalist in regards to admission                                   Medical Decision Making      Disposition and Plan     Clinical Impression:  1. Syncope, unspecified syncope type    2. Diarrhea, unspecified type         Disposition:  Admit    Follow-up:  No follow-up provider specified.        Medications Prescribed:  Current Discharge Medication List

## 2024-05-01 ENCOUNTER — APPOINTMENT (OUTPATIENT)
Dept: INTERVENTIONAL RADIOLOGY/VASCULAR | Facility: HOSPITAL | Age: 79
End: 2024-05-01
Attending: NURSE PRACTITIONER
Payer: MEDICARE

## 2024-05-01 VITALS
WEIGHT: 121.81 LBS | TEMPERATURE: 98 F | SYSTOLIC BLOOD PRESSURE: 135 MMHG | RESPIRATION RATE: 23 BRPM | DIASTOLIC BLOOD PRESSURE: 44 MMHG | HEART RATE: 61 BPM | BODY MASS INDEX: 23.91 KG/M2 | OXYGEN SATURATION: 93 % | HEIGHT: 60 IN

## 2024-05-01 PROCEDURE — 99239 HOSP IP/OBS DSCHRG MGMT >30: CPT | Performed by: INTERNAL MEDICINE

## 2024-05-01 PROCEDURE — 0JH632Z INSERTION OF MONITORING DEVICE INTO CHEST SUBCUTANEOUS TISSUE AND FASCIA, PERCUTANEOUS APPROACH: ICD-10-PCS | Performed by: NURSE PRACTITIONER

## 2024-05-01 RX ORDER — LIDOCAINE HYDROCHLORIDE AND EPINEPHRINE BITARTRATE 20; .01 MG/ML; MG/ML
INJECTION, SOLUTION SUBCUTANEOUS
Status: COMPLETED
Start: 2024-05-01 | End: 2024-05-01

## 2024-05-01 RX ORDER — CHLORHEXIDINE GLUCONATE 40 MG/ML
30 SOLUTION TOPICAL
Status: DISCONTINUED | OUTPATIENT
Start: 2024-05-02 | End: 2024-05-01

## 2024-05-01 RX ORDER — LOPERAMIDE HYDROCHLORIDE 2 MG/1
2 CAPSULE ORAL 4 TIMES DAILY PRN
Status: DISCONTINUED | OUTPATIENT
Start: 2024-05-01 | End: 2024-05-01

## 2024-05-01 NOTE — ED QUICK NOTES
Rounding Completed    Plan of Care reviewed. Waiting for room assignment.  Elimination needs assessed.  Provided blanket.    Bed is locked and in lowest position. Call light within reach.

## 2024-05-01 NOTE — CONSULTS
[facility]  Cardiology Consultation    Elizabeth Zendejas Patient Status:  Observation    1945 MRN [id number]   Location [facility] 8NE-A Attending Daniel Oshea,    Hosp Day # 0 PCP DAYO SANCHEZ     Consults      Reason for Consultation     Retired nurse here with syncope. Pt saw Dr. Spencer recently        History of Present Illness     Elizabeth Zendejas is a a(n) 79 year old female here with syncope   Was evaluated as an outpt for recurrent syncope with ILR implantation planned.      Patient has had no full episodes in her lifetime.  One of the episodes has more of a vasovagal type prodrome but the others were more abrupt in onset.  1 actually resulted in facial injury.  All diagnostic testing has been negative to date.  There have been no events while she has been wearing the heart monitors in the past.    The current episode occurred while she was seated after she had been walking around the [facility].  Over 9000 steps      History:     Past Medical History:    Esophageal reflux    High blood pressure    High cholesterol    Hypertension    IBS (irritable bowel syndrome)    Stroke (cerebrum) (HCC)     Past Surgical History:   Procedure Laterality Date     delivery only      Eye surgery       Family History   Problem Relation Age of Onset    Other (Alzheimer's) Mother     Stroke Brother       reports that she has quit smoking. Her smoking use included cigarettes. She has never used smokeless tobacco. She reports current alcohol use. She reports that she does not use drugs.      Allergies:     Allergies   Allergen Reactions    Penicillins HIVES and OTHER (SEE COMMENTS)    Sulfa Antibiotics RASH         Medications       Current Facility-Administered Medications:     loperamide (Imodium) cap 2 mg, 2 mg, Oral, QID PRN    amLODIPine (Norvasc) tab 2.5 mg, 2.5 mg, Oral, Daily    aspirin tab 325 mg, 325 mg, Oral, Daily    atorvastatin (Lipitor) tab 40 mg, 40 mg, Oral, Nightly     losartan (Cozaar) tab 100 mg, 100 mg, Oral, Daily    metoprolol succinate ER (Toprol XL) 24 hr tab 50 mg, 50 mg, Oral, 2x Daily(Beta Blocker)    acetaminophen (Tylenol Extra Strength) tab 500 mg, 500 mg, Oral, Q4H PRN    polyethylene glycol (PEG 3350) (Miralax) 17 g oral packet 17 g, 17 g, Oral, Daily PRN    sennosides (Senokot) tab 17.2 mg, 17.2 mg, Oral, Nightly PRN    bisacodyl (Dulcolax) 10 MG rectal suppository 10 mg, 10 mg, Rectal, Daily PRN    fleet enema (Fleet) 7-19 GM/118ML rectal enema 133 mL, 1 enema, Rectal, Once PRN    ondansetron (Zofran) 4 MG/2ML injection 4 mg, 4 mg, Intravenous, Q6H PRN    metoclopramide (Reglan) 5 mg/mL injection 5 mg, 5 mg, Intravenous, Q8H PRN      Review of Systems     10 point ROS was negative except  NSR      Telemetry:         Telemetry: NSR      Physical Exam     Physical Exam   Blood pressure 159/58, pulse 65, temperature 97.9 °F (36.6 °C), temperature source Oral, resp. rate 19, height 5' (1.524 m), weight 121 lb 12.8 oz (55.2 kg), SpO2 98%.  Temp (24hrs), Av.3 °F (36.8 °C), Min:97.9 °F (36.6 °C), Max:98.6 °F (37 °C)    Wt Readings from Last 3 Encounters:   24 121 lb 12.8 oz (55.2 kg)   24 121 lb 14.6 oz (55.3 kg)   24 127 lb 13.9 oz (58 kg)       NAD  PERRLA/EOMI  Neck veins not elevated  Carotids- no bruits  CTA bilaterally  Cardiac- RRR  Abdomen- Soft,Nontender, normal BS  Extremities- pulses normal  Edema-   Mood /Affect Congruent  Skin- no lesions            Lab/Radiology Results     Recent Labs   Lab 24  1358   *   BUN 23   CREATSERUM 1.02   EGFRCR 56*   CA 9.2   *   K 3.6   CL 98   CO2 24.0     Recent Labs   Lab 24  1358   RBC 3.28*   HGB 10.4*   HCT 29.5*   MCV 89.9   MCH 31.7   MCHC 35.3   RDW 13.0   NEPRELIM 4.47   WBC 7.5   .0         [unfilled]  No results for input(s): \"BNP\" in the last 168 hours.  No results found for: \"PT\", \"INR\"  No results found for: \"TROP\"      CT BRAIN OR HEAD (11338)    Result Date:  2024  CONCLUSION: 1. No acute intracranial findings 2. Cerebral atrophy with chronic microvascular ischemic changes.    LOCATION:  [facility]   Dictated by (CST): Dayo Waite MD on 2024 at 5:30 PM     Finalized by (CST): Dayo Waite MD on 2024 at 5:31 PM       XR CHEST AP PORTABLE  (CPT=71045)    Result Date: 2024  CONCLUSION:  Normal heart size and pulmonary vascularity.  No focal infiltrate, consolidation, effusion or pneumothorax.   LOCATION:  [id number]      Dictated by (CST): Ann Mejia MD on 2024 at 4:19 PM     Finalized by (CST): Ann Mejia MD on 2024 at 4:20 PM       EKG 12 Lead    Result Date: 2024  Normal sinus rhythm Normal ECG No previous ECGs found in Muse Confirmed by RAMÓN CABRAL EVANS ([id number]) on 2024 11:43:35 PM       Problem List     Patient Active Problem List   Diagnosis    History of stroke    Syncope    Head trauma    Syncope, unspecified syncope type    Diarrhea, unspecified type               Diagnostic Testing Reviewed:     EK24      Event Monitor 3/2024   [facility]:      This result has an attachment that is not available.  Impression  Triggers correspond to sinus rhythm 57-72 bpm and isolated premature  ventricular contractions    Event Monitor  The monitor duration was 30 days.  Findings:  The baseline rhythm was sinus rhythm and sinus bradycardia.  Events were recorded.  15 total events were triggered.  15 were manually triggered.  0 were auto-triggered.  Symptoms reported at time of recording include chest pain.  The strips show sinus rhythm, sinus bradycardia and premature ventricular contractions.  Premature ventricular contrations were found to be isolated.  Triggers correspond to sinus rhythm 57-72 bpm and isolated premature ventricular contractions        Echo 2024 [facility]  --Study quality: good.   -Two-dimensional transthoracic echocardiography was performed using standard views & projections with M-mode and Doppler  (continuous, pulsed wave, spectral & color flow).   -The left ventricle is normal in size. There is no left ventricular hypertrophy. Left ventricular systolic function is normal. EF = 66% (2D biplane) Left ventricular diastolic function is consistent with advanced age.   -The right ventricle is normal in size. Right ventricular systolic function is normal. The estimated right ventricular systolic pressure is 32 mmHg. The right atrial pressure is 3 mmHg. Finding is consistent with normal pulmonary artery pressures.   -There is mild to moderate tricuspid valve regurgitation.   -Sinus of Valsalva 2.8 cm. Sinotubular junction 2.9 cm. Proximal ascending aorta 2.7 cm. The visualized aorta is normal in size.     -No prior echocardiographic exam available for comparison.        Assessment/Plan:       # - Recurrent syncope  - Nl EF. No arrhythmias on external monitoring. ECG does not suggest a channelopathy.  - ILR implant today  -Some of her symptoms may have been related to volume depletion or low blood pressure.  May be worth considering an alternative antihypertensive medication that is not a diuretic.      # - Prior CVA      # - HTN    # - GERD    # - Anemia      C5      Farhad Moore MD    Cardiac Electrophysiology  [facility]  5/1/2024  10:32 AM

## 2024-05-01 NOTE — DISCHARGE SUMMARY
[facility]     Elizabeth Zendejas Patient Status:  Observation    1945 MRN [id number]   Location [facility] 8NE-A Attending Daniel Oshea,    Hosp Day # 0 PCP DAYO SANCHEZ     Date of Admission: 2024  Date of Discharge: 2024  Discharge Disposition: Home    Discharge Diagnosis:   Recurrent syncope likely due to volume depletion  History of CVA  Hypertension  GERD  Anemia    History of Present Illness: Elizabeth Zendejas is a 79 year old female with history of stroke and hypertension history of chronic anemia GERD and syncope who was brought to emergency room to be evaluated for syncopal episode.  Patient states that she was at the park with her friend and after walking and sitting down she does not remember what happened but her friend told her that she lost her consciousness.  Patient denies any chest pain shortness of breath dizziness.  She had similar episodes last year and had a Holter monitor on at that time.     Brief Synopsis: Patient felt back to her baseline in the hospital.  She was supposed to get a Linq device on Friday but had it done here in the hospital.  She was subsequently discharged home.    Lace+ Score: 42  59-90 High Risk  29-58 Medium Risk  0-28   Low Risk       TCM Follow-Up Recommendation:  LACE 29-58: Moderate Risk of readmission after discharge from the hospital.    Procedures during hospitalization:   Linq device placement    Incidental or significant findings and recommendations (brief descriptions):  None    Lab/Test results pending at Discharge:   None    Consultants:  Cardiology    Discharge Medication List:     Discharge Medications        CONTINUE taking these medications        Instructions Prescription details   amLODIPine 2.5 MG Tabs  Commonly known as: Norvasc      Take 1 tablet (2.5 mg total) by mouth daily.   Refills: 0     aspirin 325 MG Tabs      Take 1 tablet (325 mg total) by mouth daily.   Refills: 0     atorvastatin  40 MG Tabs  Commonly known as: Lipitor      Take 1 tablet (40 mg total) by mouth nightly.   Quantity: 90 tablet  Refills: 0     losartan 100 MG Tabs  Commonly known as: Cozaar      Take 1 tablet (100 mg total) by mouth daily.   Refills: 0     metoprolol succinate  MG Tb24  Commonly known as: Toprol XL      Take 1 tablet (100 mg total) by mouth daily.   Refills: 0     triamterene-hydroCHLOROthiazide 37.5-25 MG Caps  Commonly known as: Dyazide      Take 1 capsule by mouth every morning.   Refills: 0              [place] No controlled substances prescribed at discharge.    Follow-up appointment:   Teo Glez  [address] [facility] [address] [phone]    Follow up in 1 week(s)      [address] [address] [phone]  Go on 5/9/2024  at 10:30am for a wound check in the DEVICE/PACEMAKER clinic    Appointments for Next 30 Days 5/1/2024 - 5/31/2024        Date Arrival Time Visit Type Length Department Provider     5/3/2024  1:00 PM Arrive by 12:00 PM EDW IVS LOOP RECORDER HOLDING [6384] 60 min [facility] Interventional Suites EH IVS HOLDING    Patient Instructions:     When you arrive, park in the [facility], then proceed to the ground floor of the [facility] lobby and check in at the [facility] registration desk so we know you have arrived (even if you already completed eCheck-in online).     Your physician or physician's representative will follow-up with you and your loved one by phone after you are discharged.    A nurse from the Interventional Suites Department will be calling you prior to your procedure to perform a health history screening and give you instructions.  The time the nurse assigns you to arrive will be one hour prior to your procedure.  This will allow plenty of time to register and to get you prepared for your procedure.     If you have any questions, please call [phone].  We are available Monday  through Friday, 8:00 AM to 5:00 PM.      Location Instructions:     Your appointment is scheduled in the Interventional Suites Department at [facility].&nbsp; The address is [address] [place] [address].&nbsp; To reach Registration, park in the [place] and enter the doors located on the ground floor.&nbsp; Proceed to Registration, located across from the [facility], to the left of the Information Desk.  A nurse from Interventional Radiology will be calling you the day prior to your procedure with your arrival time.  Masks are optional for all patients and visitors, unless otherwise indicated.                      Vital signs:  Temp:  [97.5 °F (36.4 °C)-98.7 °F (37.1 °C)] 97.6 °F (36.4 °C)  Pulse:  [58-77] 61  Resp:  [12-23] 23  BP: (135-169)/(44-73) 135/44  SpO2:  [93 %-100 %] 93 %    Physical Exam:    General: No acute distress. Awake and alert  Respiratory: Clear to auscultation bilaterally. No wheezes. No rhonchi.  Cardiovascular: S1, S2. Regular rate and rhythm.   Abdomen: Soft, nontender, nondistended.  Positive bowel sounds.   Musculoskeletal: Moves all extremities.  Extremities: No edema.  -----------------------------------------------------------------------------------------------  PATIENT DISCHARGE INSTRUCTIONS: See electronic chart    Daniel Oshea DO    Time spent:  31 minutes

## 2024-05-01 NOTE — IMAGING NOTE
Narrative        Event Monitor 3/2024   [facility]:     This result has an attachment that is not available.  Impression  Triggers correspond to sinus rhythm 57-72 bpm and isolated premature  ventricular contractions    Event Monitor  The monitor duration was 30 days.  Findings:  The baseline rhythm was sinus rhythm and sinus bradycardia.  Events were recorded.  15 total events were triggered.  15 were manually triggered.  0 were auto-triggered.  Symptoms reported at time of recording include chest pain.  The strips show sinus rhythm, sinus bradycardia and premature ventricular contractions.  Premature ventricular contrations were found to be isolated.  Triggers correspond to sinus rhythm 57-72 bpm and isolated premature ventricular contractions      Echo 2/2024 [facility]  --Study quality: good.   -Two-dimensional transthoracic echocardiography was performed using standard views & projections with M-mode and Doppler (continuous, pulsed wave, spectral & color flow).   -The left ventricle is normal in size. There is no left ventricular hypertrophy. Left ventricular systolic function is normal. EF = 66% (2D biplane) Left ventricular diastolic function is consistent with advanced age.   -The right ventricle is normal in size. Right ventricular systolic function is normal. The estimated right ventricular systolic pressure is 32 mmHg. The right atrial pressure is 3 mmHg. Finding is consistent with normal pulmonary artery pressures.   -There is mild to moderate tricuspid valve regurgitation.   -Sinus of Valsalva 2.8 cm. Sinotubular junction 2.9 cm. Proximal ascending aorta 2.7 cm. The visualized aorta is normal in size.     -No prior echocardiographic exam available for comparison.     Electronically signed by Dr.Nimit Storm on 2/18/2024 at 9:16:21 AM

## 2024-05-01 NOTE — PROGRESS NOTES
Pt received for insertion of LINQ implantable monitor. Procedure complete. Pt tolerated well. [employer] rep speaking with pt and pt's daughter at bedside. Report called to Kesha KAHN on CTU 8. Pt transferred back to 8600 via wheelchair.

## 2024-05-01 NOTE — PLAN OF CARE
Received patient at 0730. Alert and oriented x4. Tele rhythm NSR. O2 saturation 100%. Breath sounds clear. Bed is locked and in low position. Call light and personal belongings in reach. No c/o chest pain or shortness of breath. Pt voiding with no issue. Pt ambulated in room with no issues. Skin dry and intact. Care plan reviewed, pt verbalizes understanding.       Problem: Patient/Family Goals  Goal: Patient/Family Long Term Goal  Description: Patient's Long Term Goal: \"go home/stay home\"     Interventions:  - medications as ordered by physician   - testing as ordered by physician   - See additional Care Plan goals for specific interventions  Outcome: Progressing  Goal: Patient/Family Short Term Goal  Description: Patient's Short Term Goal: \"no syncopal episodes\"     Interventions:   - medications as ordered by physician   - testing as ordered by physician   - see cardiology   - See additional Care Plan goals for specific interventions  Outcome: Progressing     Problem: SAFETY ADULT - FALL  Goal: Free from fall injury  Description: INTERVENTIONS:  - Assess pt frequently for physical needs  - Identify cognitive and physical deficits and behaviors that affect risk of falls.  - Napoleon fall precautions as indicated by assessment.  - Educate pt/family on patient safety including physical limitations  - Instruct pt to call for assistance with activity based on assessment  - Modify environment to reduce risk of injury  - Provide assistive devices as appropriate  - Consider OT/PT consult to assist with strengthening/mobility  - Encourage toileting schedule  Outcome: Progressing

## 2024-05-01 NOTE — HISTORICAL OFFICE NOTE
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 She was noted to be hyponatremic at the time.     In January, she was making breakfast and had an apparent syncopal episode (possibly two episodes at that time). Prior episode of near syncope in June.     Left LCA CVA in August.   Cardiac risk factors Never smoked  Past Medical History  1.Gastro-esophageal reflux disease without esophagitis  2.Hypertension  3.Syncope and collapse  4.Hyperlipemia  5.History of stroke  Family History  1. Father - CAD native (coronary artery disease)  2. Brother - History of stroke  Social History  Smoking status Never smoked  Tobacco usage - No (Non-smoker for personal reasons (finding))  Alcohol usage - Yes (occasional )  Review of systems  Cardiovascular No history of Chest pain, BORRERO, Palpitations, Syncope, PND, Orthopnea, Edema and Claudication  Physical Examination  Vitals Right Arm Sitting  / 60 mmHg, Pulse rate 61 bpm, Regular, Height in 5', BMI: 24.2, Weight in 124 lbs (or) 56 kgs and BSA : 1.55 cc/m²  General Appearance No Acute Distress  Head/Eyes/Ears/Nose/Mouth/Throat Conjunctiva pink, Sclera Clear and Mucous membranes Moist  Neck Normal carotid pulsations, No carotid bruits and No JVD  Respiratory Unlabored, Lungs clear with normal breath sounds and Equal bilaterally  Cardiovascular Intact distal pulses and Regular rhythm. Normal rate present. Normal and normal S1 and S2    Allergies  1.Penicillins [Snomed:4003378](Reaction:Hives [Snomed:[id number]], Severity:Severe)  2.Sulfa (Sulfonamide Antibiotics)  [Snomed:405392014](Reaction:Rash [Snomed:[id number]], Severity:Mild)  Medications  1.amLODIPine 2.5 mg tablet, Take 1 tablet orally once a day.  2.aspirin 325 mg tablet, Take 1 tablet orally once a day.  3.atorvastatin 40 mg tablet, Take 1 tablet orally once a day.  4.losartan 100 mg tablet, Take 3 tablets orally once a day.  5.metoprolol succinate  mg tablet,extended release 24 hr, Take 1 tablet orally once a day.  6.PreserVision AREDS-2 250 mg-90 mg-40 mg-1 mg capsule, Take 2 capsules orally once a day.  7.triamterene 37.5 mg-hydrochlorothiazide 25 mg tablet, Take 1 tablet orally once a day.  Impression  1.Syncope and collapse  2.Hyperlipemia  3.History of stroke  Assessment & Plan  1. Syncope- orthostatics are unremarkable for significant changes. Episodes in past are suggestive of vasovagal event or low BP. 30 day EVM was unremarkable for significant arrhythmia.   2. Left MCA CVA- she wore a monitor after this that was unremarkable.  3. Normal LVEF, no valvular disease.   4. HTN  5. History of PVCs  6. Mild normocytic anemia.     - Recommend LINQ.   - Follow up in 6 months.  Future appointments  1.Follow up visit - Enrique Spencer MD (6 Months)  Nurses documentation  Refills: none  Upcoming surgeries: none  Use of assistive device: none  MGO, CMA      Patient instructions  1. Follow up in 6 months  Care Providers: Enrique Spencer MD, Nisreen Alarcon and Marii Cook  Electronically Authenticated by  Enrique Spencer MD  04/17/2024 04:25:20 PM  Disclaimer: Components of this note were documented using voice recognition system and are subject to errors not corrected at proofreading. Contact the author of this note for any clarifications.

## 2024-05-01 NOTE — PLAN OF CARE
Received pt from ED at 2300   Pt is A/Ox4, up w/ standby.   Room air. NSR on tele. Pt denies shortness of breath or chest pain.   Also denies dizziness at this time.   Started IVF as ordered.   Skin assessment completed.   Fall precautions in place. Reviewed safety measures w/ pt. Call light in reach. Pt updated on plan of care.     Problem: Patient/Family Goals  Goal: Patient/Family Long Term Goal  Description: Patient's Long Term Goal: \"go home/stay home\"     Interventions:  - medications as ordered by physician   - testing as ordered by physician   - See additional Care Plan goals for specific interventions  Outcome: Progressing  Goal: Patient/Family Short Term Goal  Description: Patient's Short Term Goal: \"no syncopal episodes\"     Interventions:   - medications as ordered by physician   - testing as ordered by physician   - see cardiology   - See additional Care Plan goals for specific interventions  Outcome: Progressing     Problem: SAFETY ADULT - FALL  Goal: Free from fall injury  Description: INTERVENTIONS:  - Assess pt frequently for physical needs  - Identify cognitive and physical deficits and behaviors that affect risk of falls.  - [place] fall precautions as indicated by assessment.  - Educate pt/family on patient safety including physical limitations  - Instruct pt to call for assistance with activity based on assessment  - Modify environment to reduce risk of injury  - Provide assistive devices as appropriate  - Consider OT/PT consult to assist with strengthening/mobility  - Encourage toileting schedule  Outcome: Progressing

## 2024-05-01 NOTE — PROGRESS NOTES
Patient seen and examined. Feels well. No fever, nausea, vomiting, abdominal pain, chest pain, SOB or dysuria. She does have chronic diarrhea due to IBS and takes imodium as needed at home. She is supposed to get Linq device on Friday, will see if it can be done today. Cardiology consulted. Possibly home later today if ok with cardiology.    Daniel Oshea, DO

## 2024-05-01 NOTE — PROCEDURES
[facility]  Implantable Loop Recorder Implant Procedure Note    Elizabeth Zendejas Patient Status:  Outpatient in a Bed    1945 MRN [id number]   Location [facility] INTERVENTIONAL SUITES Attending No att. providers found   Hosp Day # 0 PCP DAYO SANCHEZ     OPERATION(S) PERFORMED:   1. Implantable Loop Recorder Implantation     : Shell BALDWIN  INDICATION: syncope  COMPLICATIONS: None      ESTIMATED BLOOD LOSS: Minimal.  SEDATION:None       METHODS: The patient was brought to the EP lab in a nonsedated state after providing informed consent. The area if the ILR was cleaned, prepped and draped in sterile fashion.  After administering 1% lidocaine for local anesthesia, an incision was made with the provided tool. The ILR was inserted under the skin.    Hemostasis was achieved with manual pressure.    Steristrips were placed with a occlusive dressing.     CONCLUSIONS:   1. Status post successful implantation of a [employer] LinQ I .   Visible P waves 0.6mV.     Shell BALDWIN  [facility] [place]

## 2024-05-01 NOTE — DISCHARGE INSTRUCTIONS
You will go home with a dressing over the incision site. This will need to remain in place for 5 days. You may sponge bath or use a hand held sprayer to shower until the 5th day.    On day 5, wash your hands with soap and water for 20 seconds prior to removing the dressing. You may remove the dressing and shower. Leave the steri-strips in place. They will fall off on their own.     Make an appointment to follow up at the pacemaker/device clinic for a site check in 1 week.     If you have a fever, redness and/or swelling at the insertion site, increased pain or drainage call your cardiologist office right away.     You may have slight bruising at the insertion site. Take Tylenol for discomfort if you are not allergic.    Remember to sleep within 6 feet of your monitor.    If you have any questions, problems or concerns please contact your cardiologist.

## 2024-05-01 NOTE — PROGRESS NOTES
04/30/24 2302 04/30/24 2304 04/30/24 2311   Vital Signs   Pulse 77 62 68   Heart Rate Source  --   --  Monitor   Resp  --   --  18   Respiratory Quality  --   --  Normal   BP (!) 166/73 154/56 158/69   MAP (mmHg) 95  --  93   BP Location Right arm Right arm Right arm   BP Method Automatic Automatic Automatic   Patient Position Lying Sitting Standing     Pt denied dizziness or lightheadedness at time of admission

## 2024-05-03 ENCOUNTER — HOSPITAL ENCOUNTER (OUTPATIENT)
Dept: INTERVENTIONAL RADIOLOGY/VASCULAR | Facility: HOSPITAL | Age: 79
Discharge: HOME | End: 2024-05-03
Attending: INTERNAL MEDICINE
Payer: MEDICARE

## 2024-07-31 DIAGNOSIS — Z86.73 HISTORY OF STROKE: ICD-10-CM

## 2024-07-31 RX ORDER — ATORVASTATIN CALCIUM 40 MG/1
40 TABLET, FILM COATED ORAL NIGHTLY
Qty: 90 TABLET | Refills: 0 | Status: SHIPPED | OUTPATIENT
Start: 2024-07-31

## 2024-07-31 NOTE — TELEPHONE ENCOUNTER
Medication: ATORVASTATIN 40 MG Oral Tab      Date of last refill: 03/07/2024 (#90/0)  [place]     Last office visit: 04/11/2024  Due back to clinic per last office note:  6 months  Date next office visit scheduled:    Future Appointments   Date Time Provider Department Center   10/11/2024 11:40 AM Roland Trevino DO [id number]           Last OV note recommendation:    Assessment:  This is a 79-year-old female with a small stroke at an outside hospital and August 2023.  She came to the neurology clinic today for follow-up and stroke management.  Her exam as noted above.  OSH records reviewed.        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 Syncope  - EEG was normal     3. Head trauma  - CTH  - EEG was normal      RTC 6 months        Roland Trevino DO  Neurology

## 2024-10-11 ENCOUNTER — OFFICE VISIT (OUTPATIENT)
Dept: NEUROLOGY | Facility: CLINIC | Age: 79
End: 2024-10-11
Payer: MEDICARE

## 2024-10-11 VITALS
HEART RATE: 71 BPM | RESPIRATION RATE: 15 BRPM | BODY MASS INDEX: 24 KG/M2 | DIASTOLIC BLOOD PRESSURE: 56 MMHG | SYSTOLIC BLOOD PRESSURE: 128 MMHG | WEIGHT: 123.44 LBS

## 2024-10-11 DIAGNOSIS — R55 SYNCOPE, UNSPECIFIED SYNCOPE TYPE: ICD-10-CM

## 2024-10-11 DIAGNOSIS — Z86.73 HISTORY OF STROKE: Primary | ICD-10-CM

## 2024-10-11 PROCEDURE — 99213 OFFICE O/P EST LOW 20 MIN: CPT | Performed by: OTHER

## 2024-10-11 RX ORDER — ANTIOX #8/OM3/DHA/EPA/LUT/ZEAX 250-2.5 MG
CAPSULE ORAL
COMMUNITY

## 2024-10-11 RX ORDER — LOPERAMIDE HCL 2 MG
2 CAPSULE ORAL 4 TIMES DAILY PRN
COMMUNITY

## 2024-10-11 NOTE — PROGRESS NOTES
Neurology H&P    Elizabeth Zendejas Patient Status:  No patient class for patient encounter    1945 MRN [id number]   Location [facility], [address] [place] [address] Attending No att. providers found   Hosp Day # 0 PCP DAYO SANCHEZ     Subjective:  Initial Clinic HPI 2024  Elizabeth Zendejas is a(n) 79 year old female with a past medical history of HTN, HL, and stroke in 2023.  She comes the neurology clinic to establish care at [facility] for her recent small stroke. She comes with a friend today. She has a swollen R face and states that she was found on the floor. Her son found her on the kitchen floor. She does not remember what happened. She states that she remembers being very tired but that's it. This is not why she came to see me however. She came to see me for a stroke that occurred in 2023. She states that she was treated at [facility]. She states that she was trying to call her daughter and that she could not remember or dial the correct number. She ended up getting a hold of her sister in law (Dr. Elliott David/Sanju) who told her to go to the ED. She was taken to the ED by her family. She states that her sodium was found to be abnormal and that she also was found to have a stroke. She had some word finding difficulty and R facial weakness. Per notes she also had a UTI and hyponatremia, and BP elevated to 170's SBP    Interim history:  Patient was last seen in clinic on 24 for her stroke.  She is currently taking atorvastatin and aspirin for secondary prevention.  She has seen cardiology and had a Linq device placed for her recurrent episodes of syncope. It has not shown any arhythmia to this time. She states that she has been doing well. She has not had any new stroke like symptoms. She has not had any new episodes of syncope    Current Medications:  Current Outpatient Medications   Medication Sig Dispense Refill    ATORVASTATIN 40 MG Oral Tab Take  1 tablet (40 mg total) by mouth nightly. 90 tablet 0    amLODIPine 2.5 MG Oral Tab Take 1 tablet (2.5 mg total) by mouth daily.      losartan 100 MG Oral Tab Take 1 tablet (100 mg total) by mouth daily.      metoprolol succinate  MG Oral Tablet 24 Hr Take 1 tablet (100 mg total) by mouth daily.      aspirin 325 MG Oral Tab Take 1 tablet (325 mg total) by mouth daily.         Problem List:  Patient Active Problem List   Diagnosis    History of stroke    Syncope    Head trauma    Syncope, unspecified syncope type    Diarrhea, unspecified type       PMHx:  Past Medical History:    Esophageal reflux    High blood pressure    High cholesterol    Hypertension    IBS (irritable bowel syndrome)    Stroke (cerebrum) (HCC)       PSHx:  Past Surgical History:   Procedure Laterality Date     delivery only      Eye surgery         SocHx:  Social History     Socioeconomic History    Marital status: Unknown   Tobacco Use    Smoking status: Former     Types: Cigarettes    Smokeless tobacco: Never    Tobacco comments:     Very long long ago   Vaping Use    Vaping status: Never Used   Substance and Sexual Activity    Alcohol use: Yes     Comment: rare, special occ    Drug use: Never   Other Topics Concern    Caffeine Concern Yes     Comment: coffee daily    Exercise Yes     Comment: 3 times per week     Social Drivers of Health     Food Insecurity: No Food Insecurity (2024)    Food Insecurity     Food Insecurity: Never true   Transportation Needs: No Transportation Needs (2024)    Transportation Needs     Lack of Transportation: No    [facility]    Social Connections   Housing Stability: Low Risk  (2024)    Housing Stability     Housing Instability: No       Family History:  Family History   Problem Relation Age of Onset    Other (Alzheimer's) Mother     Stroke Brother        No FH of stroke at early age    ROS:  10 point ROS completed and  was negative, except for pertinent positive and negatives stated in subjective.    Objective/Physical Exam:    Vital Signs:  There were no vitals taken for this visit.    Gen: Awake and in no apparent distress  HEENT: moist mucus membranes  Neck: Supple  Cardiovascular: Regular rate and rhythm, no murmur  Pulm: CTAB  GI: non-tender, normal bowel sounds  Skin: normal, dry  Extremities: No clubbing or cyanosis      Neurologic:   MENTAL STATUS: alert, ox3, normal attention, language and fund of knowledge.      CRANIAL NERVES II to XII: PERRLA, no ptosis or diplopia, EOM intact, facial sensation intact, strong eye closure, R lower facial weakness, tongue midline,  no tongue fasciculations or atrophy, strong shoulder shrug.    MOTOR EXAMINATION: normal tone, no fasciculations, normal strength throughout in UEs and LEs      SENSORY EXAMINATION:  UE: intact to light touch, pinprick intact  LE: intact to light touch, pinprick intact    COORDINATION:  No dysmetria, or intention tremors     REFLEXES: 2+ at biceps, 2+ brachioradialis, 2+ at patella     GAIT: normal stance, normal  gait             Labs:       Imaging:  OSH MRI 8/2023  FINDINGS:    5 mm focus of restricted diffusion in the mid left corona radiata with a couple   punctate foci of restricted diffusion in the posterior left corona radiata.   No acute intracranial hemorrhage or extra-axial fluid collection is identified.   There are mild to moderate periventricular and deep subcortical white matter T2   FLAIR hyperintensities which are non-specific but likely represent mild to   moderate chronic microvascular ischemic changes.   No abnormal parenchymal gradient susceptibility is seen.    There is mild global parenchymal volume loss with mild associated prominence of   the ventricles and sulci. No hydrocephalus.   There is no midline shift or mass effect.  The basal cisterns are intact.    The sagittal T1 image demonstrates normal marrow signal intensity in the    calvarium, skull base, and upper cervical spine.    The expected major intracranial flow voids are present.    Stool and secretions in the left mastoid air cells. Trace secretions in the   right mastoid air cells.. Bilateral lens replacements.   IMPRESSION:    1.  Small recent infarct in the left mid corona radiata with additional couple   punctate recent infarcts in the posterior left corona radiata.     MRA brain 8/2023  IMPRESSION:   Moderate short segment luminal narrowing of the mid left M1 segment which may   relate to atherosclerotic plaque or nonocclusive thrombus.   Of note, tiny aneurysms measuring less than 3 mm in diameter and small vessel   disease involving the peripheral arterial branches cannot be excluded by this   study.     CUS     Approximately 16-49% stenosis of the proximal internal carotid arteries (closer   to 16% on gray scale images).     EEG 1/16/24  IMPRESSION:  This EEG is a normal study recorded in the awake states. No focal, lateralizing, or epileptiform activity is seen and no seizures are recorded.      Report covers  Start 1/16/24 at 1106  End 1/16/24 at 1153    CTH 1/4/24  CONCLUSION:  No acute bleed.  White matter signal hyperintensities somewhat asymmetric greater to the left, which probably represent asymmetric chronic ischemic white matter changes which were present on MRI from August 2023. However if there are   clinical or symptoms suspicious for acute infarct or ischemia, then advise MRI follow-up for further evaluation of these findings.  No midline shift, mass effect herniation hydrocephalus.     Assessment:  This is a 79-year-old female with a small stroke at an outside hospital and August 2023.  She can continue her aspirin and atorvastatin at this time. Her LDL is below her goal of 70. She has not had any episodes of syncope since last spring and is feeling well.       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 Syncope  - EEG was normal  - Has followed with cardiology and had a LINQ placed  - No clear etiology at this time        RTC 12 months      Roland Trevino DO  Neurology

## 2024-10-11 NOTE — PATIENT INSTRUCTIONS
After your visit at the [facility] today,  please direct any follow up questions or medication needs to the staff in our [place] office so that your concerns may be promptly addressed.  We are available through [employer] or at the numbers below:    The phone number is:   [phone] option #1    The fax number is:  [phone]    Your pharmacy should also send any requests electronically to the [place] office.  Refill policies:    Allow 2-3 business days for refills; controlled substances may take longer.  Contact your pharmacy at least 5 days prior to running out of medication and have them send an electronic request or submit request through the “request refill” option in your [employer] account.  Refills are not addressed on weekends; covering physicians do not authorize routine medications on weekends.  No narcotics or controlled substances are refilled after noon on Fridays or by on call physicians.  By law, narcotics must be electronically prescribed.  A 30 day supply with no refills is the maximum allowed.  If your prescription is due for a refill, you may be due for a follow up appointment.  To best provide you care, patients receiving routine medications need to be seen at least once a year.  Patients receiving narcotic/controlled substance medications need to be seen at least once every 3 months.  In the event that your preferred pharmacy does not have the requested medication in stock (e.g. Backordered), it is your responsibility to find another pharmacy that has the requested medication available.  We will gladly send a new prescription to that pharmacy at your request.    Scheduling Tests:    If your physician has ordered radiology tests such as MRI or CT scans, please contact Central Scheduling at [phone] right away to schedule the test.  Once scheduled, the [facility] Centralized Referral Team will work with your insurance carrier to obtain pre-certification or prior authorization.   Depending on your insurance carrier, approval may take 3-10 days.  It is highly recommended patients assure they have received an authorization before having a test performed.  If test is done without insurance authorization, patient may be responsible for the entire amount billed.      Precertification and Prior Authorizations:  If your physician has recommended that you have a procedure or additional testing performed the [facility] Centralized Referral Team will contact your insurance carrier to obtain pre-certification or prior authorization.    You are strongly encouraged to contact your insurance carrier to verify that your procedure/test has been approved and is a COVERED benefit.  Although the [facility] Centralized Referral Team does its due diligence, the insurance carrier gives the disclaimer that \"Although the procedure is authorized, this does not guarantee payment.\"    Ultimately the patient is responsible for payment.   Thank you for your understanding in this matter.  Paperwork Completion:  If you require FMLA or disability paperwork for your recovery, please make sure to either drop it off or have it faxed to our office at [phone]. Be sure the form has your name and date of birth on it.  The form will be faxed to our Forms Department and they will complete it for you.  There is a 25$ fee for all forms that need to be filled out.  Please be aware there is a 10-14 day turnaround time.  You will need to sign a release of information (FATMATA) form if your paperwork does not come with one.  You may call the Forms Department with any questions at [phone].  Their fax number is [phone].

## 2024-11-02 DIAGNOSIS — Z86.73 HISTORY OF STROKE: ICD-10-CM

## 2024-11-04 RX ORDER — ATORVASTATIN CALCIUM 40 MG/1
40 TABLET, FILM COATED ORAL NIGHTLY
Qty: 90 TABLET | Refills: 0 | Status: SHIPPED | OUTPATIENT
Start: 2024-11-04

## 2024-11-04 NOTE — TELEPHONE ENCOUNTER
Medication: ATORVASTATIN 40 MG Oral Tab      Date of last refill: 7/31/2024 (#90/0)  [place]     Last office visit: 10/11/2024  Due back to clinic per last office note:  12 Months   Date next office visit scheduled:    No future appointments.        Last OV note recommendation:       Assessment:  This is a 79-year-old female with a small stroke at an outside hospital and August 2023.  She can continue her aspirin and atorvastatin at this time. Her LDL is below her goal of 70. She has not had any episodes of syncope since last spring and is feeling well.         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 Syncope  - EEG was normal  - Has followed with cardiology and had a LINQ placed  - No clear etiology at this time         RTC 12 months        Roland Trevino,   Neurology

## 2025-01-30 DIAGNOSIS — Z86.73 HISTORY OF STROKE: ICD-10-CM

## 2025-01-30 RX ORDER — ATORVASTATIN CALCIUM 40 MG/1
40 TABLET, FILM COATED ORAL NIGHTLY
Qty: 90 TABLET | Refills: 1 | Status: SHIPPED | OUTPATIENT
Start: 2025-01-30

## 2025-07-24 DIAGNOSIS — Z86.73 HISTORY OF STROKE: ICD-10-CM

## 2025-07-24 RX ORDER — ATORVASTATIN CALCIUM 40 MG/1
40 TABLET, FILM COATED ORAL NIGHTLY
Qty: 90 TABLET | Refills: 0 | Status: SHIPPED | OUTPATIENT
Start: 2025-07-24

## 2025-07-24 NOTE — TELEPHONE ENCOUNTER
Medication: ATORVASTATIN 40 MG Oral Tab      Date of last refill: 01/30/2025 (#90/1)  [place]     Last office visit: 10/11/2024  Due back to clinic per last office note:  12 months  Date next office visit scheduled:    No future appointments.        Last OV note recommendation:    Assessment:  This is a 79-year-old female with a small stroke at an outside hospital and August 2023.  She can continue her aspirin and atorvastatin at this time. Her LDL is below her goal of 70. She has not had any episodes of syncope since last spring and is feeling well.         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 Syncope  - EEG was normal  - Has followed with cardiology and had a LINQ placed  - No clear etiology at this time         RTC 12 months        Roland Trevino,   Neurology  
room air

## (undated) NOTE — LETTER
[address]  [address]  Consent for Procedure/Sedation  Date: 05/01/2024         Time: 1325    I hereby authorize Dr Lebron Wing and ASHLEY Maya, my physician and his/her assistants (if applicable), which may include medical students, residents, and/or fellows, to perform the following surgical operation/ procedure and administer such anesthesia as may be determined necessary by my physician: Continuous Loop Recorder Device Implant  on Elizabeth Zendejas  2.   I recognize that during the surgical operation/procedure, unforeseen conditions may necessitate additional or different procedures than those listed above.  I, therefore, further authorize and request that the above-named surgeon, assistants, or designees perform such procedures as are, in their judgment, necessary and desirable.    3.   My surgeon/physician has discussed prior to my surgery the potential benefits, risks and side effects of this procedure; the likelihood of achieving goals; and potential problems that might occur during recuperation.  They also discussed reasonable alternatives to the procedure, including risks, benefits, and side effects related to the alternatives and risks related to not receiving this procedure.  I have had all my questions answered and I acknowledge that no guarantee has been made as to the result that may be obtained.    4.   Should the need arise during my operation/procedure, which includes change of level of care prior to discharge, I also consent to the administration of blood and/or blood products.  Further, I understand that despite careful testing and screening of blood or blood products by collecting agencies, I may still be subject to ill effects as a result of receiving a blood transfusion and/or blood products.  The following are some, but not all, of the potential risks that can occur: fever and allergic reactions, hemolytic reactions, transmission of diseases  such as Hepatitis, AIDS and Cytomegalovirus (CMV) and fluid overload.  In the event that I wish to have an autologous transfusion of my own blood, or a directed donor transfusion, I will discuss this with my physician.   Check only if Refusing Blood or Blood Products  I understand refusal of blood or blood products as deemed necessary by my physician may have serious consequences to my condition to include possible death. I hereby assume responsibility for my refusal and release the hospital, its personnel, and my physicians from any responsibility for the consequences of my refusal.         o  Refuse         5.   I authorize the use of any specimen, organs, tissues, body parts or foreign objects that may be removed from my body during the operation/procedure for diagnosis, research or teaching purposes and their subsequent disposal by hospital authorities.  I also authorize the release of specimen test results and/or written reports to my treating physician on the hospital medical staff or other referring or consulting physicians involved in my care, at the discretion of the Pathologist or my treating physician.    6.   I consent to the photographing or videotaping of the operations or procedures to be performed, including appropriate portions of my body for medical, scientific, or educational purposes, provided my identity is not revealed by the pictures or by descriptive texts accompanying them.  If the procedure has been photographed/videotaped, the surgeon will obtain the original picture, image, videotape or CD.  The hospital will not be responsible for storage, release or maintenance of the picture, image, tape or CD.    7.   I consent to the presence of a  or observers in the operating room as deemed necessary by my physician or their designees.    8.   I recognize that in the event my procedure results in extended X-Ray/fluoroscopy time, I may develop a skin reaction.    9. If I have a Do  Not Attempt Resuscitation (DNAR) order in place, that status will be suspended while in the operating room, procedural suite, and during the recovery period unless otherwise explicitly stated by me (or a person authorized to consent on my behalf). The surgeon or my attending physician will determine when the applicable recovery period ends for purposes of reinstating the DNAR order.  10. Patients having a sterilization procedure: I understand that if the procedure is successful the results will be permanent and it will therefore be impossible for me to inseminate, conceive, or bear children.  I also understand that the procedure is intended to result in sterility, although the result has not been guaranteed.   11. I acknowledge that my physician has explained sedation/analgesia administration to me including the risk and benefits I consent to the administration of sedation/analgesia as may be necessary or desirable in the judgment of my physician.    I CERTIFY THAT I HAVE READ AND FULLY UNDERSTAND THE ABOVE CONSENT TO OPERATION and/or OTHER PROCEDURE.        ____________________________________       _________________________________      ______________________________  Signature of Patient         Signature of Responsible Person        Printed Name of Responsible Person        ____________________________________      _________________________________      ______________________________       Signature of Witness          Relationship to Patient                       Date                                       Time  Patient Name: Elizabeth Zendejas  : 1945    Reviewed: 2024   Printed: May 1, 2024  Medical Record #: [id number] Page 1 of 1

## (undated) NOTE — LETTER
[address]  [address]  Consent for Procedure/Sedation  Date: 05/01/2024         Time: 1200    I hereby authorize Dr. Lebron Wing, my physician and his/her assistants (if applicable), which may include medical students, residents, and/or fellows, to perform the following surgical operation/ procedure and administer such anesthesia as may be determined necessary by my physician: Loop recorder implant on Elizabeth Paceis  2.   I recognize that during the surgical operation/procedure, unforeseen conditions may necessitate additional or different procedures than those listed above.  I, therefore, further authorize and request that the above-named surgeon, assistants, or designees perform such procedures as are, in their judgment, necessary and desirable.    3.   My surgeon/physician has discussed prior to my surgery the potential benefits, risks and side effects of this procedure; the likelihood of achieving goals; and potential problems that might occur during recuperation.  They also discussed reasonable alternatives to the procedure, including risks, benefits, and side effects related to the alternatives and risks related to not receiving this procedure.  I have had all my questions answered and I acknowledge that no guarantee has been made as to the result that may be obtained.    4.   Should the need arise during my operation/procedure, which includes change of level of care prior to discharge, I also consent to the administration of blood and/or blood products.  Further, I understand that despite careful testing and screening of blood or blood products by collecting agencies, I may still be subject to ill effects as a result of receiving a blood transfusion and/or blood products.  The following are some, but not all, of the potential risks that can occur: fever and allergic reactions, hemolytic reactions, transmission of diseases such as Hepatitis, AIDS and Cytomegalovirus  (CMV) and fluid overload.  In the event that I wish to have an autologous transfusion of my own blood, or a directed donor transfusion, I will discuss this with my physician.   Check only if Refusing Blood or Blood Products  I understand refusal of blood or blood products as deemed necessary by my physician may have serious consequences to my condition to include possible death. I hereby assume responsibility for my refusal and release the hospital, its personnel, and my physicians from any responsibility for the consequences of my refusal.         o  Refuse         5.   I authorize the use of any specimen, organs, tissues, body parts or foreign objects that may be removed from my body during the operation/procedure for diagnosis, research or teaching purposes and their subsequent disposal by hospital authorities.  I also authorize the release of specimen test results and/or written reports to my treating physician on the hospital medical staff or other referring or consulting physicians involved in my care, at the discretion of the Pathologist or my treating physician.    6.   I consent to the photographing or videotaping of the operations or procedures to be performed, including appropriate portions of my body for medical, scientific, or educational purposes, provided my identity is not revealed by the pictures or by descriptive texts accompanying them.  If the procedure has been photographed/videotaped, the surgeon will obtain the original picture, image, videotape or CD.  The hospital will not be responsible for storage, release or maintenance of the picture, image, tape or CD.    7.   I consent to the presence of a  or observers in the operating room as deemed necessary by my physician or their designees.    8.   I recognize that in the event my procedure results in extended X-Ray/fluoroscopy time, I may develop a skin reaction.    9. If I have a Do Not Attempt Resuscitation (DNAR) order in  place, that status will be suspended while in the operating room, procedural suite, and during the recovery period unless otherwise explicitly stated by me (or a person authorized to consent on my behalf). The surgeon or my attending physician will determine when the applicable recovery period ends for purposes of reinstating the DNAR order.  10. Patients having a sterilization procedure: I understand that if the procedure is successful the results will be permanent and it will therefore be impossible for me to inseminate, conceive, or bear children.  I also understand that the procedure is intended to result in sterility, although the result has not been guaranteed.   11. I acknowledge that my physician has explained sedation/analgesia administration to me including the risk and benefits I consent to the administration of sedation/analgesia as may be necessary or desirable in the judgment of my physician.    I CERTIFY THAT I HAVE READ AND FULLY UNDERSTAND THE ABOVE CONSENT TO OPERATION and/or OTHER PROCEDURE.        ____________________________________       _________________________________      ______________________________  Signature of Patient         Signature of Responsible Person        Printed Name of Responsible Person        ____________________________________      _________________________________      ______________________________       Signature of Witness          Relationship to Patient                       Date                                       Time  Patient Name: Elizabeth Vasquezmichael  : 1945    Reviewed: 2024   Printed: May 1, 2024  Medical Record #: [id number] Page 1 of 1